# Patient Record
Sex: MALE | Race: WHITE | Employment: OTHER | URBAN - METROPOLITAN AREA
[De-identification: names, ages, dates, MRNs, and addresses within clinical notes are randomized per-mention and may not be internally consistent; named-entity substitution may affect disease eponyms.]

---

## 2024-06-26 RX ORDER — HYDROCODONE BITARTRATE AND ACETAMINOPHEN 10; 325 MG/1; MG/1
1 TABLET ORAL EVERY 6 HOURS PRN
Status: ON HOLD | COMMUNITY
End: 2024-07-05 | Stop reason: HOSPADM

## 2024-06-26 RX ORDER — CARVEDILOL 12.5 MG/1
12.5 TABLET ORAL DAILY
COMMUNITY

## 2024-06-26 RX ORDER — ZOLPIDEM TARTRATE 10 MG/1
10 TABLET ORAL NIGHTLY PRN
COMMUNITY

## 2024-06-26 RX ORDER — IRBESARTAN 150 MG/1
150 TABLET ORAL NIGHTLY
COMMUNITY

## 2024-06-26 RX ORDER — BACLOFEN 10 MG/1
10 TABLET ORAL 2 TIMES DAILY
COMMUNITY

## 2024-06-26 RX ORDER — GABAPENTIN 600 MG/1
600 TABLET ORAL 4 TIMES DAILY
COMMUNITY

## 2024-06-26 RX ORDER — TIRZEPATIDE 7.5 MG/.5ML
7.5 INJECTION, SOLUTION SUBCUTANEOUS WEEKLY
COMMUNITY

## 2024-06-26 RX ORDER — GLIMEPIRIDE 4 MG/1
4 TABLET ORAL 2 TIMES DAILY WITH MEALS
COMMUNITY

## 2024-06-26 RX ORDER — FAMOTIDINE 10 MG
10 TABLET ORAL 2 TIMES DAILY PRN
COMMUNITY

## 2024-06-26 RX ORDER — CLOPIDOGREL BISULFATE 75 MG/1
75 TABLET ORAL DAILY
Status: ON HOLD | COMMUNITY
End: 2024-07-05

## 2024-06-26 RX ORDER — OXCARBAZEPINE 600 MG/1
600 TABLET, FILM COATED ORAL 2 TIMES DAILY
COMMUNITY

## 2024-06-26 RX ORDER — CITALOPRAM 20 MG/1
20 TABLET ORAL DAILY
COMMUNITY

## 2024-06-26 RX ORDER — AMLODIPINE BESYLATE 10 MG/1
10 TABLET ORAL DAILY
COMMUNITY

## 2024-06-26 RX ORDER — TAMSULOSIN HYDROCHLORIDE 0.4 MG/1
0.4 CAPSULE ORAL DAILY
COMMUNITY

## 2024-06-26 RX ORDER — NITROGLYCERIN 0.4 MG/1
0.4 TABLET SUBLINGUAL EVERY 5 MIN PRN
COMMUNITY

## 2024-06-26 RX ORDER — ROPINIROLE 2 MG/1
2 TABLET, FILM COATED ORAL 4 TIMES DAILY
COMMUNITY

## 2024-06-26 RX ORDER — SITAGLIPTIN AND METFORMIN HYDROCHLORIDE 500; 50 MG/1; MG/1
1 TABLET, FILM COATED ORAL 2 TIMES DAILY WITH MEALS
COMMUNITY

## 2024-06-26 RX ORDER — DAPAGLIFLOZIN 10 MG/1
10 TABLET, FILM COATED ORAL EVERY MORNING
COMMUNITY

## 2024-06-26 NOTE — PROGRESS NOTES
6/26/24 @ 6377 Pt confirms went to PCP Dr. Ha Luna [phone] yesterday 6/25 for Dr. Ha Luna in [facility] for PreOp visit including EKG and Labwork. Pt and wife confirm pt Cardiologist is Dr. Flavio Holland [phone] in [place], [place] and is still working on getting a cardiac clearance. MD    6/26/24 @ 1500 Spoke with Malissa @ PCP Dr. Ha Luna [phone] requesting H&P, Labwork results, EKG tracing from 6/25 PreOp visit to be faxed to PAT# given with read back and Malissa states \"Will be getting lab results tomorrow 6/27 and will fax everything then\". MD    6/26/24 @ 4400 No evidence of orders in [employer]/Mad Mimi via this nurse and EVE Hall RN. Left VM with Laureano @ Dr. Helm's office requesting orders to be faxed to PAT# given and that pt is still working on getting cardiac clearance. MD

## 2024-06-26 NOTE — PROGRESS NOTES
[facility] PRE-SURGICAL TESTING INSTRUCTIONS                      PRIOR TO PROCEDURE DATE:    1. PLEASE FOLLOW ANY INSTRUCTIONS GIVEN TO YOU PER YOUR SURGEON.      2. Arrange for someone to drive you home and be with you for the first 24 hours after discharge for your safety after your procedure for which you received sedation. Ensure it is someone we can share information with regarding your discharge.     NOTE: At this time ONLY 2 ADULTS may accompany you   One person ENCOURAGED to stay at hospital entire time if outpatient surgery      3. You must contact your surgeon for instructions IF:  You are taking any blood thinners, aspirin, anti-inflammatory or vitamins.  There is a change in your physical condition such as a cold, fever, rash, cuts, sores, or any other infection, especially near your surgical site.    4. Do not drink alcohol the day before or day of your procedure.  Do not use any recreational marijuana at least 24 hours or street drugs (heroin, cocaine) at minimum 5 days prior to your procedure.     5. A Pre-Surgical History and Physical MUST be completed WITHIN 30 DAYS OR LESS prior to your procedure.by your Physician or an Urgent Care        THE DAY OF YOUR PROCEDURE:  1.  Follow instructions for ARRIVAL TIME as DIRECTED BY YOUR SURGEON.     2. Enter the MAIN entrance from [facility] and follow the signs to the free Parking Garage or  Parking (offered free of charge 7 am-5pm).      3. Enter the Main Entrance of the hospital (do not enter from the lower level of the parking garage). Upon entrance, check in with the  at the surgical information desk on your LEFT.   Bring your insurance card and photo ID to register      4. DO NOT EAT ANYTHING 8 hours prior to arrival for surgery.  You may have up to 8 ounces of water 4 hours prior to your arrival for surgery.   NOTE: ALL Gastric, Bariatric & Bowel surgery patients - you MUST follow your surgeon's instructions regarding  eating/ drinking as you will have very specific instructions to follow.  If you did not receive these, call your surgeon's office immediately.     5. MEDICATIONS:  Take the following medications with a SMALL sip of water: Carvidelol, Amlodipine, Pepcid  Use your usual dose of inhalers the morning of surgery. BRING your rescue inhaler with you to hospital.   Anesthesia does NOT want you to take insulin the morning of surgery. They will control your blood sugar while you are at the hospital. Please contact your ordering physician for instructions regarding your insulin the night before your procedure. If you have an insulin pump, please keep it set on basal rate.   Bariatric patient's call your surgeon if on diabetic medications as some may need to be stopped 1 week prior to surgery    6. Do not swallow additional water when brushing teeth. No gum, candy, mints, or ice chips. Refrain from smoking or at least decrease the amount on day of surgery.    7. Morning of surgery:   Take a shower with an antibacterial soap (i.e., Safeguard or Dial) OR your physician may have instructed you to use Hibiclens.  Dress in loose, comfortable clothing appropriate for redressing after your procedure.   Do not wear jewelry (including body piercings), make-up (especially NO eye make-up), fingernail polish (NO toenail polish if foot/leg surgery), lotion, powders, or metal hairclips.   Do not shave or wax for 72 hours prior to procedure near your operative site. Shaving with a razor can irritate your skin and make it easier to develop an infection. On the day of your procedure, any hair that needs to be removed near the surgical site will be 'clipped' by a healthcare worker using a special clipper designed to avoid skin irritation.    8. Dentures, glasses, or contacts will need to be removed before your procedure. Bring cases for your glasses, contacts, dentures, or hearing aids to protect them while you are in surgery.      9. If you use  a CPAP, please bring it with you on the day of your procedure.    10. If you use oxygen at home, please bring your oxygen tank with you to hospital..     11. We recommend that valuable personal belongings such as cash, cell phones, e-tablets, or jewelry, be left at home during your stay. The hospital will not be responsible for valuables that are not secured in the hospital safe. However, if your insurance requires a co-pay, you may want to bring a method of payment, i.e., Check or credit card, if you wish to pay your co-pay the day of surgery.      12. If you are to stay overnight, you may bring a bag with personal items. Please have any large items you may need brought in by your family after your arrival to your hospital room.    13. If you have a Living Will or Durable Power of , please bring a copy on the day of your procedure.     How we keep you safe and work to prevent surgical site infections:   1. Health care workers should always check your ID bracelet to verify your name and birth date. You will be asked many times to state your name, date of birth, and allergies.    2. Health care workers should always clean their hands with soap or alcohol gel before providing care to you. It is okay to ask anyone if they cleaned their hands before they touch you.    3. You will be actively involved in verifying the type of procedure you are having and ensuring the correct surgical site. This will be confirmed multiple times prior to your procedure. Do NOT mee your surgery site UNLESS instructed to by your surgeon.     4. When you are in the operating room, your surgical site will be cleansed with a special soap, and in most cases, you will be given an antibiotic before the surgery begins.      What to expect AFTER your procedure?  1. Immediately following your procedure, your will be taken to the PACU for the first phase of your recovery.  Your nurse will help you recover from any potential side effects of

## 2024-07-03 ENCOUNTER — APPOINTMENT (OUTPATIENT)
Dept: CT IMAGING | Age: 64
End: 2024-07-03
Attending: NEUROLOGICAL SURGERY
Payer: MEDICARE

## 2024-07-03 ENCOUNTER — ANESTHESIA EVENT (OUTPATIENT)
Dept: OPERATING ROOM | Age: 64
End: 2024-07-03
Payer: MEDICARE

## 2024-07-03 ENCOUNTER — ANESTHESIA (OUTPATIENT)
Dept: OPERATING ROOM | Age: 64
End: 2024-07-03
Payer: MEDICARE

## 2024-07-03 ENCOUNTER — HOSPITAL ENCOUNTER (INPATIENT)
Age: 64
LOS: 2 days | Discharge: HOME | End: 2024-07-05
Attending: NEUROLOGICAL SURGERY | Admitting: NEUROLOGICAL SURGERY
Payer: MEDICARE

## 2024-07-03 DIAGNOSIS — Z98.890 S/P CRANIOTOMY: Primary | ICD-10-CM

## 2024-07-03 PROBLEM — G50.0 TRIGEMINAL NEURALGIA: Status: ACTIVE | Noted: 2024-07-03

## 2024-07-03 LAB
ABO + RH BLD: NORMAL
APTT BLD: 24.9 SEC (ref 22.1–36.4)
BLD GP AB SCN SERPL QL: NORMAL
GLUCOSE BLD-MCNC: 149 MG/DL (ref 70–99)
GLUCOSE BLD-MCNC: 160 MG/DL (ref 70–99)
GLUCOSE BLD-MCNC: 249 MG/DL (ref 70–99)
INR PPP: 0.89 (ref 0.85–1.15)
PERFORMED ON: ABNORMAL
PROTHROMBIN TIME: 12.2 SEC (ref 11.9–14.9)

## 2024-07-03 PROCEDURE — 6370000000 HC RX 637 (ALT 250 FOR IP): Performed by: NEUROLOGICAL SURGERY

## 2024-07-03 PROCEDURE — 6360000002 HC RX W HCPCS: Performed by: ANESTHESIOLOGY

## 2024-07-03 PROCEDURE — 2780000010 HC IMPLANT OTHER: Performed by: NEUROLOGICAL SURGERY

## 2024-07-03 PROCEDURE — 7100000001 HC PACU RECOVERY - ADDTL 15 MIN: Performed by: NEUROLOGICAL SURGERY

## 2024-07-03 PROCEDURE — 3600000004 HC SURGERY LEVEL 4 BASE: Performed by: NEUROLOGICAL SURGERY

## 2024-07-03 PROCEDURE — 3700000000 HC ANESTHESIA ATTENDED CARE: Performed by: NEUROLOGICAL SURGERY

## 2024-07-03 PROCEDURE — 7100000000 HC PACU RECOVERY - FIRST 15 MIN: Performed by: NEUROLOGICAL SURGERY

## 2024-07-03 PROCEDURE — 6370000000 HC RX 637 (ALT 250 FOR IP): Performed by: PHYSICIAN ASSISTANT

## 2024-07-03 PROCEDURE — 2500000003 HC RX 250 WO HCPCS: Performed by: NEUROLOGICAL SURGERY

## 2024-07-03 PROCEDURE — 86901 BLOOD TYPING SEROLOGIC RH(D): CPT

## 2024-07-03 PROCEDURE — 2709999900 HC NON-CHARGEABLE SUPPLY: Performed by: NEUROLOGICAL SURGERY

## 2024-07-03 PROCEDURE — 00NK0ZZ RELEASE TRIGEMINAL NERVE, OPEN APPROACH: ICD-10-PCS | Performed by: NEUROLOGICAL SURGERY

## 2024-07-03 PROCEDURE — 2580000003 HC RX 258: Performed by: NEUROLOGICAL SURGERY

## 2024-07-03 PROCEDURE — 86850 RBC ANTIBODY SCREEN: CPT

## 2024-07-03 PROCEDURE — 2580000003 HC RX 258: Performed by: PHYSICIAN ASSISTANT

## 2024-07-03 PROCEDURE — 2580000003 HC RX 258

## 2024-07-03 PROCEDURE — 6360000002 HC RX W HCPCS

## 2024-07-03 PROCEDURE — 6360000002 HC RX W HCPCS: Performed by: NEUROLOGICAL SURGERY

## 2024-07-03 PROCEDURE — 99291 CRITICAL CARE FIRST HOUR: CPT

## 2024-07-03 PROCEDURE — 3700000001 HC ADD 15 MINUTES (ANESTHESIA): Performed by: NEUROLOGICAL SURGERY

## 2024-07-03 PROCEDURE — 6360000002 HC RX W HCPCS: Performed by: PHYSICIAN ASSISTANT

## 2024-07-03 PROCEDURE — 1200000000 HC SEMI PRIVATE

## 2024-07-03 PROCEDURE — 85730 THROMBOPLASTIN TIME PARTIAL: CPT

## 2024-07-03 PROCEDURE — 70450 CT HEAD/BRAIN W/O DYE: CPT

## 2024-07-03 PROCEDURE — 2500000003 HC RX 250 WO HCPCS

## 2024-07-03 PROCEDURE — 3600000014 HC SURGERY LEVEL 4 ADDTL 15MIN: Performed by: NEUROLOGICAL SURGERY

## 2024-07-03 PROCEDURE — 2720000010 HC SURG SUPPLY STERILE: Performed by: NEUROLOGICAL SURGERY

## 2024-07-03 PROCEDURE — A4217 STERILE WATER/SALINE, 500 ML: HCPCS | Performed by: NEUROLOGICAL SURGERY

## 2024-07-03 PROCEDURE — 85610 PROTHROMBIN TIME: CPT

## 2024-07-03 PROCEDURE — 86900 BLOOD TYPING SEROLOGIC ABO: CPT

## 2024-07-03 DEVICE — PTERIONAL-LEFT-SMOOTH
Type: IMPLANTABLE DEVICE | Status: FUNCTIONAL
Brand: MEDPOR

## 2024-07-03 RX ORDER — MEPERIDINE HYDROCHLORIDE 25 MG/ML
12.5 INJECTION INTRAMUSCULAR; INTRAVENOUS; SUBCUTANEOUS EVERY 5 MIN PRN
Status: DISCONTINUED | OUTPATIENT
Start: 2024-07-03 | End: 2024-07-03 | Stop reason: HOSPADM

## 2024-07-03 RX ORDER — FAMOTIDINE 20 MG/1
20 TABLET, FILM COATED ORAL 2 TIMES DAILY
Status: DISCONTINUED | OUTPATIENT
Start: 2024-07-03 | End: 2024-07-05 | Stop reason: HOSPADM

## 2024-07-03 RX ORDER — SODIUM CHLORIDE 0.9 % (FLUSH) 0.9 %
5-40 SYRINGE (ML) INJECTION PRN
Status: DISCONTINUED | OUTPATIENT
Start: 2024-07-03 | End: 2024-07-03 | Stop reason: HOSPADM

## 2024-07-03 RX ORDER — FLUTICASONE PROPIONATE 50 MCG
1 SPRAY, SUSPENSION (ML) NASAL DAILY
COMMUNITY

## 2024-07-03 RX ORDER — CIPROFLOXACIN HYDROCHLORIDE 3.5 MG/ML
SOLUTION/ DROPS TOPICAL PRN
Status: DISCONTINUED | OUTPATIENT
Start: 2024-07-03 | End: 2024-07-03 | Stop reason: HOSPADM

## 2024-07-03 RX ORDER — DEXAMETHASONE 4 MG/1
4 TABLET ORAL EVERY 6 HOURS
Status: DISCONTINUED | OUTPATIENT
Start: 2024-07-03 | End: 2024-07-05 | Stop reason: HOSPADM

## 2024-07-03 RX ORDER — ONDANSETRON 2 MG/ML
4 INJECTION INTRAMUSCULAR; INTRAVENOUS EVERY 6 HOURS PRN
Status: DISCONTINUED | OUTPATIENT
Start: 2024-07-03 | End: 2024-07-05 | Stop reason: HOSPADM

## 2024-07-03 RX ORDER — MORPHINE SULFATE 4 MG/ML
4 INJECTION INTRAVENOUS
Status: DISCONTINUED | OUTPATIENT
Start: 2024-07-03 | End: 2024-07-05 | Stop reason: HOSPADM

## 2024-07-03 RX ORDER — HYDROMORPHONE HYDROCHLORIDE 1 MG/ML
0.5 INJECTION, SOLUTION INTRAMUSCULAR; INTRAVENOUS; SUBCUTANEOUS EVERY 5 MIN PRN
Status: DISCONTINUED | OUTPATIENT
Start: 2024-07-03 | End: 2024-07-03 | Stop reason: HOSPADM

## 2024-07-03 RX ORDER — FAMOTIDINE 20 MG/1
10 TABLET, FILM COATED ORAL 2 TIMES DAILY PRN
Status: DISCONTINUED | OUTPATIENT
Start: 2024-07-03 | End: 2024-07-05 | Stop reason: HOSPADM

## 2024-07-03 RX ORDER — OXCARBAZEPINE 300 MG/1
600 TABLET, FILM COATED ORAL 2 TIMES DAILY
Status: DISCONTINUED | OUTPATIENT
Start: 2024-07-03 | End: 2024-07-05 | Stop reason: HOSPADM

## 2024-07-03 RX ORDER — SODIUM CHLORIDE 9 MG/ML
INJECTION, SOLUTION INTRAVENOUS CONTINUOUS PRN
Status: DISCONTINUED | OUTPATIENT
Start: 2024-07-03 | End: 2024-07-03

## 2024-07-03 RX ORDER — CARVEDILOL 12.5 MG/1
12.5 TABLET ORAL DAILY
Status: DISCONTINUED | OUTPATIENT
Start: 2024-07-04 | End: 2024-07-05 | Stop reason: HOSPADM

## 2024-07-03 RX ORDER — NALOXONE HYDROCHLORIDE 0.4 MG/ML
INJECTION, SOLUTION INTRAMUSCULAR; INTRAVENOUS; SUBCUTANEOUS PRN
Status: DISCONTINUED | OUTPATIENT
Start: 2024-07-03 | End: 2024-07-03 | Stop reason: HOSPADM

## 2024-07-03 RX ORDER — PROPOFOL 10 MG/ML
INJECTION, EMULSION INTRAVENOUS CONTINUOUS PRN
Status: DISCONTINUED | OUTPATIENT
Start: 2024-07-03 | End: 2024-07-03 | Stop reason: SDUPTHER

## 2024-07-03 RX ORDER — DEXAMETHASONE SODIUM PHOSPHATE 4 MG/ML
INJECTION, SOLUTION INTRA-ARTICULAR; INTRALESIONAL; INTRAMUSCULAR; INTRAVENOUS; SOFT TISSUE PRN
Status: DISCONTINUED | OUTPATIENT
Start: 2024-07-03 | End: 2024-07-03 | Stop reason: SDUPTHER

## 2024-07-03 RX ORDER — MANNITOL 20 G/100ML
INJECTION, SOLUTION INTRAVENOUS PRN
Status: DISCONTINUED | OUTPATIENT
Start: 2024-07-03 | End: 2024-07-03 | Stop reason: SDUPTHER

## 2024-07-03 RX ORDER — SODIUM CHLORIDE, SODIUM LACTATE, POTASSIUM CHLORIDE, CALCIUM CHLORIDE 600; 310; 30; 20 MG/100ML; MG/100ML; MG/100ML; MG/100ML
INJECTION, SOLUTION INTRAVENOUS CONTINUOUS
Status: DISCONTINUED | OUTPATIENT
Start: 2024-07-03 | End: 2024-07-03 | Stop reason: HOSPADM

## 2024-07-03 RX ORDER — SODIUM CHLORIDE 0.9 % (FLUSH) 0.9 %
5-40 SYRINGE (ML) INJECTION EVERY 12 HOURS SCHEDULED
Status: DISCONTINUED | OUTPATIENT
Start: 2024-07-03 | End: 2024-07-05 | Stop reason: HOSPADM

## 2024-07-03 RX ORDER — SODIUM CHLORIDE 0.9 % (FLUSH) 0.9 %
5-40 SYRINGE (ML) INJECTION EVERY 12 HOURS SCHEDULED
Status: DISCONTINUED | OUTPATIENT
Start: 2024-07-03 | End: 2024-07-03 | Stop reason: HOSPADM

## 2024-07-03 RX ORDER — ALOGLIPTIN 12.5 MG/1
12.5 TABLET, FILM COATED ORAL DAILY
Status: DISCONTINUED | OUTPATIENT
Start: 2024-07-04 | End: 2024-07-05 | Stop reason: HOSPADM

## 2024-07-03 RX ORDER — MORPHINE SULFATE 2 MG/ML
2 INJECTION, SOLUTION INTRAMUSCULAR; INTRAVENOUS
Status: DISCONTINUED | OUTPATIENT
Start: 2024-07-03 | End: 2024-07-05 | Stop reason: HOSPADM

## 2024-07-03 RX ORDER — OXYCODONE HYDROCHLORIDE 5 MG/1
5 TABLET ORAL EVERY 4 HOURS PRN
Status: DISCONTINUED | OUTPATIENT
Start: 2024-07-03 | End: 2024-07-05 | Stop reason: HOSPADM

## 2024-07-03 RX ORDER — LABETALOL HYDROCHLORIDE 5 MG/ML
10 INJECTION, SOLUTION INTRAVENOUS
Status: DISCONTINUED | OUTPATIENT
Start: 2024-07-03 | End: 2024-07-03 | Stop reason: HOSPADM

## 2024-07-03 RX ORDER — EPHEDRINE SULFATE 50 MG/ML
INJECTION INTRAVENOUS PRN
Status: DISCONTINUED | OUTPATIENT
Start: 2024-07-03 | End: 2024-07-03 | Stop reason: SDUPTHER

## 2024-07-03 RX ORDER — FENTANYL CITRATE 50 UG/ML
INJECTION, SOLUTION INTRAMUSCULAR; INTRAVENOUS PRN
Status: DISCONTINUED | OUTPATIENT
Start: 2024-07-03 | End: 2024-07-03 | Stop reason: SDUPTHER

## 2024-07-03 RX ORDER — LIDOCAINE HYDROCHLORIDE 20 MG/ML
INJECTION, SOLUTION INTRAVENOUS PRN
Status: DISCONTINUED | OUTPATIENT
Start: 2024-07-03 | End: 2024-07-03 | Stop reason: SDUPTHER

## 2024-07-03 RX ORDER — GABAPENTIN 600 MG/1
600 TABLET ORAL 4 TIMES DAILY
Status: DISCONTINUED | OUTPATIENT
Start: 2024-07-03 | End: 2024-07-05 | Stop reason: HOSPADM

## 2024-07-03 RX ORDER — TAMSULOSIN HYDROCHLORIDE 0.4 MG/1
0.4 CAPSULE ORAL DAILY
Status: DISCONTINUED | OUTPATIENT
Start: 2024-07-03 | End: 2024-07-05 | Stop reason: HOSPADM

## 2024-07-03 RX ORDER — GLIPIZIDE 5 MG/1
2.5 TABLET ORAL
Status: DISCONTINUED | OUTPATIENT
Start: 2024-07-04 | End: 2024-07-05 | Stop reason: HOSPADM

## 2024-07-03 RX ORDER — INSULIN LISPRO 100 [IU]/ML
0-4 INJECTION, SOLUTION INTRAVENOUS; SUBCUTANEOUS
Status: DISCONTINUED | OUTPATIENT
Start: 2024-07-03 | End: 2024-07-04

## 2024-07-03 RX ORDER — ONDANSETRON 4 MG/1
4 TABLET, ORALLY DISINTEGRATING ORAL EVERY 8 HOURS PRN
Status: DISCONTINUED | OUTPATIENT
Start: 2024-07-03 | End: 2024-07-05 | Stop reason: HOSPADM

## 2024-07-03 RX ORDER — GLYCOPYRROLATE 0.2 MG/ML
INJECTION INTRAMUSCULAR; INTRAVENOUS PRN
Status: DISCONTINUED | OUTPATIENT
Start: 2024-07-03 | End: 2024-07-03 | Stop reason: SDUPTHER

## 2024-07-03 RX ORDER — ONDANSETRON 2 MG/ML
INJECTION INTRAMUSCULAR; INTRAVENOUS PRN
Status: DISCONTINUED | OUTPATIENT
Start: 2024-07-03 | End: 2024-07-03 | Stop reason: SDUPTHER

## 2024-07-03 RX ORDER — HYDROMORPHONE HYDROCHLORIDE 1 MG/ML
0.5 INJECTION, SOLUTION INTRAMUSCULAR; INTRAVENOUS; SUBCUTANEOUS EVERY 10 MIN PRN
Status: DISCONTINUED | OUTPATIENT
Start: 2024-07-03 | End: 2024-07-03 | Stop reason: HOSPADM

## 2024-07-03 RX ORDER — SODIUM CHLORIDE 9 MG/ML
INJECTION, SOLUTION INTRAVENOUS PRN
Status: DISCONTINUED | OUTPATIENT
Start: 2024-07-03 | End: 2024-07-03 | Stop reason: HOSPADM

## 2024-07-03 RX ORDER — LABETALOL HYDROCHLORIDE 5 MG/ML
10 INJECTION, SOLUTION INTRAVENOUS EVERY 4 HOURS PRN
Status: DISCONTINUED | OUTPATIENT
Start: 2024-07-03 | End: 2024-07-05 | Stop reason: HOSPADM

## 2024-07-03 RX ORDER — LEVETIRACETAM 500 MG/1
500 TABLET ORAL EVERY 12 HOURS
Status: DISCONTINUED | OUTPATIENT
Start: 2024-07-03 | End: 2024-07-05 | Stop reason: HOSPADM

## 2024-07-03 RX ORDER — SODIUM CHLORIDE 0.9 % (FLUSH) 0.9 %
5-40 SYRINGE (ML) INJECTION PRN
Status: DISCONTINUED | OUTPATIENT
Start: 2024-07-03 | End: 2024-07-05 | Stop reason: HOSPADM

## 2024-07-03 RX ORDER — SODIUM CHLORIDE, SODIUM LACTATE, POTASSIUM CHLORIDE, CALCIUM CHLORIDE 600; 310; 30; 20 MG/100ML; MG/100ML; MG/100ML; MG/100ML
INJECTION, SOLUTION INTRAVENOUS CONTINUOUS PRN
Status: DISCONTINUED | OUTPATIENT
Start: 2024-07-03 | End: 2024-07-03 | Stop reason: SDUPTHER

## 2024-07-03 RX ORDER — DIAZEPAM 5 MG/1
5 TABLET ORAL EVERY 6 HOURS PRN
Status: DISCONTINUED | OUTPATIENT
Start: 2024-07-03 | End: 2024-07-05 | Stop reason: HOSPADM

## 2024-07-03 RX ORDER — LOSARTAN POTASSIUM 25 MG/1
25 TABLET ORAL DAILY
Status: DISCONTINUED | OUTPATIENT
Start: 2024-07-03 | End: 2024-07-05 | Stop reason: HOSPADM

## 2024-07-03 RX ORDER — METOCLOPRAMIDE HYDROCHLORIDE 5 MG/ML
10 INJECTION INTRAMUSCULAR; INTRAVENOUS
Status: DISCONTINUED | OUTPATIENT
Start: 2024-07-03 | End: 2024-07-03 | Stop reason: HOSPADM

## 2024-07-03 RX ORDER — HYDROMORPHONE HYDROCHLORIDE 2 MG/ML
INJECTION, SOLUTION INTRAMUSCULAR; INTRAVENOUS; SUBCUTANEOUS PRN
Status: DISCONTINUED | OUTPATIENT
Start: 2024-07-03 | End: 2024-07-03 | Stop reason: SDUPTHER

## 2024-07-03 RX ORDER — DIPHENHYDRAMINE HYDROCHLORIDE 50 MG/ML
12.5 INJECTION INTRAMUSCULAR; INTRAVENOUS
Status: COMPLETED | OUTPATIENT
Start: 2024-07-03 | End: 2024-07-03

## 2024-07-03 RX ORDER — HYDRALAZINE HYDROCHLORIDE 20 MG/ML
10 INJECTION INTRAMUSCULAR; INTRAVENOUS
Status: DISCONTINUED | OUTPATIENT
Start: 2024-07-03 | End: 2024-07-03 | Stop reason: HOSPADM

## 2024-07-03 RX ORDER — SODIUM CHLORIDE 9 MG/ML
INJECTION, SOLUTION INTRAVENOUS PRN
Status: DISCONTINUED | OUTPATIENT
Start: 2024-07-03 | End: 2024-07-05 | Stop reason: HOSPADM

## 2024-07-03 RX ORDER — INSULIN LISPRO 100 [IU]/ML
0-4 INJECTION, SOLUTION INTRAVENOUS; SUBCUTANEOUS NIGHTLY
Status: DISCONTINUED | OUTPATIENT
Start: 2024-07-03 | End: 2024-07-05 | Stop reason: HOSPADM

## 2024-07-03 RX ORDER — ACETAMINOPHEN 325 MG/1
650 TABLET ORAL EVERY 6 HOURS
Status: DISCONTINUED | OUTPATIENT
Start: 2024-07-03 | End: 2024-07-05 | Stop reason: HOSPADM

## 2024-07-03 RX ORDER — CITALOPRAM 20 MG/1
20 TABLET ORAL DAILY
Status: DISCONTINUED | OUTPATIENT
Start: 2024-07-03 | End: 2024-07-05 | Stop reason: HOSPADM

## 2024-07-03 RX ORDER — BACLOFEN 10 MG/1
10 TABLET ORAL 2 TIMES DAILY
Status: DISCONTINUED | OUTPATIENT
Start: 2024-07-03 | End: 2024-07-05 | Stop reason: HOSPADM

## 2024-07-03 RX ORDER — ROPINIROLE 1 MG/1
2 TABLET, FILM COATED ORAL 4 TIMES DAILY
Status: DISCONTINUED | OUTPATIENT
Start: 2024-07-03 | End: 2024-07-05 | Stop reason: HOSPADM

## 2024-07-03 RX ORDER — AMLODIPINE BESYLATE 10 MG/1
10 TABLET ORAL DAILY
Status: DISCONTINUED | OUTPATIENT
Start: 2024-07-04 | End: 2024-07-05 | Stop reason: HOSPADM

## 2024-07-03 RX ORDER — SUCCINYLCHOLINE/SOD CL,ISO/PF 200MG/10ML
SYRINGE (ML) INTRAVENOUS PRN
Status: DISCONTINUED | OUTPATIENT
Start: 2024-07-03 | End: 2024-07-03 | Stop reason: SDUPTHER

## 2024-07-03 RX ORDER — OXYCODONE HYDROCHLORIDE 5 MG/1
10 TABLET ORAL EVERY 4 HOURS PRN
Status: DISCONTINUED | OUTPATIENT
Start: 2024-07-03 | End: 2024-07-05 | Stop reason: HOSPADM

## 2024-07-03 RX ORDER — PROPOFOL 10 MG/ML
INJECTION, EMULSION INTRAVENOUS PRN
Status: DISCONTINUED | OUTPATIENT
Start: 2024-07-03 | End: 2024-07-03 | Stop reason: SDUPTHER

## 2024-07-03 RX ORDER — MIDAZOLAM HYDROCHLORIDE 1 MG/ML
INJECTION INTRAMUSCULAR; INTRAVENOUS PRN
Status: DISCONTINUED | OUTPATIENT
Start: 2024-07-03 | End: 2024-07-03 | Stop reason: SDUPTHER

## 2024-07-03 RX ADMIN — OXYCODONE 5 MG: 5 TABLET ORAL at 18:11

## 2024-07-03 RX ADMIN — EPHEDRINE SULFATE 5 MG: 50 INJECTION INTRAVENOUS at 13:14

## 2024-07-03 RX ADMIN — MIDAZOLAM HYDROCHLORIDE 2 MG: 2 INJECTION, SOLUTION INTRAMUSCULAR; INTRAVENOUS at 12:13

## 2024-07-03 RX ADMIN — BACLOFEN 10 MG: 10 TABLET ORAL at 20:54

## 2024-07-03 RX ADMIN — OXCARBAZEPINE 600 MG: 300 TABLET, FILM COATED ORAL at 20:55

## 2024-07-03 RX ADMIN — PHENYLEPHRINE HYDROCHLORIDE 20 MCG/MIN: 10 INJECTION INTRAVENOUS at 13:14

## 2024-07-03 RX ADMIN — HYDROMORPHONE HYDROCHLORIDE 0.5 MG: 2 INJECTION, SOLUTION INTRAMUSCULAR; INTRAVENOUS; SUBCUTANEOUS at 15:05

## 2024-07-03 RX ADMIN — METFORMIN HYDROCHLORIDE 500 MG: 500 TABLET, FILM COATED ORAL at 20:55

## 2024-07-03 RX ADMIN — DIAZEPAM 5 MG: 5 TABLET ORAL at 22:57

## 2024-07-03 RX ADMIN — EPHEDRINE SULFATE 5 MG: 50 INJECTION INTRAVENOUS at 12:47

## 2024-07-03 RX ADMIN — DEXAMETHASONE SODIUM PHOSPHATE 10 MG: 4 INJECTION INTRA-ARTICULAR; INTRALESIONAL; INTRAMUSCULAR; INTRAVENOUS; SOFT TISSUE at 13:03

## 2024-07-03 RX ADMIN — ACETAMINOPHEN 650 MG: 325 TABLET ORAL at 19:01

## 2024-07-03 RX ADMIN — EPHEDRINE SULFATE 5 MG: 50 INJECTION INTRAVENOUS at 13:00

## 2024-07-03 RX ADMIN — WATER 2000 MG: 1 INJECTION INTRAMUSCULAR; INTRAVENOUS; SUBCUTANEOUS at 12:20

## 2024-07-03 RX ADMIN — TAMSULOSIN HYDROCHLORIDE 0.4 MG: 0.4 CAPSULE ORAL at 20:54

## 2024-07-03 RX ADMIN — LEVETIRACETAM 500 MG: 500 TABLET, FILM COATED ORAL at 19:01

## 2024-07-03 RX ADMIN — Medication 160 MG: at 12:19

## 2024-07-03 RX ADMIN — HYDROMORPHONE HYDROCHLORIDE 1 MG: 2 INJECTION, SOLUTION INTRAMUSCULAR; INTRAVENOUS; SUBCUTANEOUS at 12:32

## 2024-07-03 RX ADMIN — DEXAMETHASONE 4 MG: 4 TABLET ORAL at 23:56

## 2024-07-03 RX ADMIN — MANNITOL 40 G: 20 INJECTION, SOLUTION INTRAVENOUS at 13:06

## 2024-07-03 RX ADMIN — CITALOPRAM HYDROBROMIDE 20 MG: 20 TABLET ORAL at 20:55

## 2024-07-03 RX ADMIN — PROPOFOL 200 MG: 10 INJECTION, EMULSION INTRAVENOUS at 12:19

## 2024-07-03 RX ADMIN — PROPOFOL 150 MCG/KG/MIN: 10 INJECTION, EMULSION INTRAVENOUS at 12:25

## 2024-07-03 RX ADMIN — ROPINIROLE HYDROCHLORIDE 2 MG: 1 TABLET, FILM COATED ORAL at 20:54

## 2024-07-03 RX ADMIN — FAMOTIDINE 10 MG: 20 TABLET, FILM COATED ORAL at 20:54

## 2024-07-03 RX ADMIN — HYDROMORPHONE HYDROCHLORIDE 0.5 MG: 2 INJECTION, SOLUTION INTRAMUSCULAR; INTRAVENOUS; SUBCUTANEOUS at 15:01

## 2024-07-03 RX ADMIN — WATER 2000 MG: 1 INJECTION INTRAMUSCULAR; INTRAVENOUS; SUBCUTANEOUS at 20:55

## 2024-07-03 RX ADMIN — SODIUM CHLORIDE, PRESERVATIVE FREE 10 ML: 5 INJECTION INTRAVENOUS at 20:55

## 2024-07-03 RX ADMIN — ACETAMINOPHEN 650 MG: 325 TABLET ORAL at 23:56

## 2024-07-03 RX ADMIN — LOSARTAN POTASSIUM 25 MG: 25 TABLET, FILM COATED ORAL at 20:54

## 2024-07-03 RX ADMIN — FAMOTIDINE 20 MG: 20 TABLET, FILM COATED ORAL at 20:56

## 2024-07-03 RX ADMIN — DIPHENHYDRAMINE HYDROCHLORIDE 12.5 MG: 50 INJECTION INTRAMUSCULAR; INTRAVENOUS at 16:34

## 2024-07-03 RX ADMIN — MORPHINE SULFATE 2 MG: 2 INJECTION, SOLUTION INTRAMUSCULAR; INTRAVENOUS at 21:09

## 2024-07-03 RX ADMIN — DEXAMETHASONE 4 MG: 4 TABLET ORAL at 19:01

## 2024-07-03 RX ADMIN — GABAPENTIN 600 MG: 600 TABLET, FILM COATED ORAL at 20:54

## 2024-07-03 RX ADMIN — GLYCOPYRROLATE 0.2 MG: 0.2 INJECTION INTRAMUSCULAR; INTRAVENOUS at 12:55

## 2024-07-03 RX ADMIN — LIDOCAINE HYDROCHLORIDE 100 MG: 20 INJECTION, SOLUTION INTRAVENOUS at 12:19

## 2024-07-03 RX ADMIN — ONDANSETRON 4 MG: 2 INJECTION INTRAMUSCULAR; INTRAVENOUS at 14:12

## 2024-07-03 RX ADMIN — REMIFENTANIL HYDROCHLORIDE 0.15 MCG/KG/MIN: 1 INJECTION, POWDER, LYOPHILIZED, FOR SOLUTION INTRAVENOUS at 12:25

## 2024-07-03 RX ADMIN — SODIUM CHLORIDE, SODIUM LACTATE, POTASSIUM CHLORIDE, AND CALCIUM CHLORIDE: .6; .31; .03; .02 INJECTION, SOLUTION INTRAVENOUS at 12:13

## 2024-07-03 RX ADMIN — OXYCODONE 10 MG: 5 TABLET ORAL at 22:56

## 2024-07-03 RX ADMIN — FENTANYL CITRATE 100 MCG: 50 INJECTION, SOLUTION INTRAMUSCULAR; INTRAVENOUS at 12:19

## 2024-07-03 ASSESSMENT — PAIN DESCRIPTION - ORIENTATION
ORIENTATION: LEFT
ORIENTATION: LEFT

## 2024-07-03 ASSESSMENT — PAIN DESCRIPTION - DESCRIPTORS
DESCRIPTORS: ACHING;DULL
DESCRIPTORS: ACHING
DESCRIPTORS: ACHING;DISCOMFORT

## 2024-07-03 ASSESSMENT — PAIN DESCRIPTION - LOCATION
LOCATION: HEAD

## 2024-07-03 ASSESSMENT — PAIN SCALES - GENERAL
PAINLEVEL_OUTOF10: 0
PAINLEVEL_OUTOF10: 6
PAINLEVEL_OUTOF10: 6
PAINLEVEL_OUTOF10: 4
PAINLEVEL_OUTOF10: 8
PAINLEVEL_OUTOF10: 5
PAINLEVEL_OUTOF10: 6

## 2024-07-03 ASSESSMENT — PAIN - FUNCTIONAL ASSESSMENT
PAIN_FUNCTIONAL_ASSESSMENT: 0-10
PAIN_FUNCTIONAL_ASSESSMENT: ACTIVITIES ARE NOT PREVENTED

## 2024-07-03 NOTE — PROGRESS NOTES
PACU Transfer to Floor Note    Procedure(s):  LEFT MICROVASCULAR DECOMPRESSIN, POSSIBLE RHIZOTOMY    Current Allergies: Lactose    Pt meets criteria as per Ariella Score and ASPAN Standards to transfer to next phase of care.     Recent Labs     07/03/24  1117   POCGLU 160*       Vitals:    07/03/24 1641   BP:    Pulse: 83   Resp: 28   Temp:    SpO2:      Vitals within 20% of pt's admission vitals as per ARIELLA SCORE    SpO2: 100 %    O2 Flow Rate (L/min): 6 L/min      Intake/Output Summary (Last 24 hours) at 7/3/2024 1646  Last data filed at 7/3/2024 1641  Gross per 24 hour   Intake 2200 ml   Output 2300 ml   Net -100 ml     Patient transported to CT on way to ICU  Pain assessment:  not receiving treatment    Pain Level: 4    Patient was assessed for alterations to skin integrity. There were not alterations observed.    Is patient incontinent: no    Handoff report given at bedside.   Family updated and directed to pt room      7/3/2024 4:46 PM

## 2024-07-03 NOTE — H&P
I saw and examined Aston Aguilar on 7/3/2024There has been no change to her history or physical in the interval time since consent. He will proceed with the planned procedure.     Briana Nuno PA-C

## 2024-07-03 NOTE — CONSULTS
NEUROCRITICAL CARE CONSULT NOTE       Franco Helm MD is requesting this consult.  Reason for Consult: Post-op ICU management   Admission Chief Complaint: Presented for elective surgery     History of Present Illness     Aston Aguilar is a 63 y.o. y/o male with a h/o stroke DM 2, HLD and trigeminal neuralgia who presents s/p elective L microvascular decompression.    REVIEW OF SYSTEMS:   Constitutional- No weight loss or fevers   Eyes- No diplopia. No photophobia.   Ears/nose/throat- No dysphagia. No Dysarthria   Cardiovascular- No palpitations. No chest pain   Respiratory- No dyspnea. No Cough   Gastrointestinal- No Abdominal pain. No Vomiting.   Genitourinary- No incontinence. No urinary retention   Musculoskeletal- No myalgia. No arthralgia   Skin- No rash. No easy bruising.   Psychiatric- No depression. No anxiety   Endocrine- No diabetes. No thyroid issues.   Hematologic- No bleeding difficulty. No fatigue   Neurologic- No vision changes. C/o headache at incision, L sided weakness and decreased sensation from previous stroke.     Past Medical, Surgical, Family, and Social History   PAST MEDICAL HISTORY:  Past Medical History:   Diagnosis Date    Broken toes     CAD (coronary artery disease)     Cerebral artery occlusion with cerebral infarction (HCC)     Diabetes mellitus (HCC)     ED (erectile dysfunction)     Enlarged prostate     GERD (gastroesophageal reflux disease)     Hyperlipidemia     Hypertension     PONV (postoperative nausea and vomiting)     Trigeminal neuralgia     Urine frequency     Wears glasses      SURGICAL HISTORY:  Past Surgical History:   Procedure Laterality Date    CEREBRAL MICROVASCULAR DECOMPRESSION Left     COLONOSCOPY      CORONARY STENT PLACEMENT  2016    x1 stent    ENDOSCOPY, COLON, DIAGNOSTIC      LYMPH NODE BIOPSY Right     groin    ROTATOR CUFF REPAIR Left 2018    ROTATOR CUFF REPAIR Right 2022    SKIN BIOPSY      TONSILLECTOMY       FAMILY HISTORY & SOCIAL    SpO2 100%   BMI 30.74 kg/m²     General Alert, no distress, well-nourished    Neurologic  Mental status:   Orientation to person, place, time, situation  Attention intact as able to attend well to the exam    Language fluent in conversation  Comprehension intact; follows simple commands    Cranial nerves:   CN2: Visual fields full w/o extinction on confrontational testing  CN 3,4,6: Pupils equal and reactive to light, extraocular muscles intact  CN5: Facial sensation symmetric   CN7: Face symmetric  CN8: Hearing symmetric to spoken voice  CN9: Palate elevated symmetrically  CN11: Traps full strength on shoulder shrug  CN12: Tongue midline with protrusion    Motor Exam:   R  L    Deltoid 5 4   Biceps 5 4   Triceps 5 4   Wrist extension  5 4   Interossei 5 4      R  L    Hip flexion  5 4   Hip extension  5 4   Knee flexion  5 4   Knee extension  5 4   Ankle dorsiflexion  5 4   Ankle plantar flexion  5 4       Sensory: light touch intact in all 4 extremities, decreased sensation on L side  Cerebellar/coordination: finger nose finger normal without ataxia  Tone: normal in all 4 extremities  Incision: edges approximated, dry dressing CDI    Cardiovascular: Warm, appears well perfused   Respiratory: Easy, non-labored respiratory pattern   Abdominal: Abdomen is without distention   Extremities: Upper and lower extremities are atraumatic in appearance without deformity.    Diagnostic Results   IMAGES:  Images personally reviewed and agree w/ radiology interpretation of :    CT Head w/o Contrast:  IMPRESSION:  1.  Status post left microvascular decompression without evidence of  complication.      LABS:  All results below personally reviewed. Pertinent positives & negatives are addressed in Impression & Plan below.     LABS   Metabolic Panel No results for input(s): \"NA\", \"K\", \"CL\", \"CO2\", \"BUN\", \"CREATININE\", \"GLUCOSE\", \"CALCIUM\", \"LABALBU\", \"PHOS\", \"MG\", \"ALKPHOS\", \"ALT\", \"AST\", \"AMMONIA\" in the last 72 hours.   CBC /  Jess Recent Labs     07/03/24  1128   INR 0.89      Other No results found for: \"LABA1C\", \"TRIG\", \"TSH\", \"CSRNOSJD33\", \"FOLATE\", \"LABSALI\", \"COVID19\"  No results found for: \"PHENYTOIN\", \"LACOSA\", \"LAMO\", \"VALPROATE\", \"LACTSEPSIS\", \"LACTA\"       CURRENT SCHEDULED MEDICATIONS   Inpatient Medications     amLODIPine, 10 mg, Oral, Daily    baclofen, 10 mg, Oral, BID    carvedilol, 12.5 mg, Oral, Daily    citalopram, 20 mg, Oral, Daily    gabapentin, 600 mg, Oral, 4x Daily    [START ON 7/4/2024] glipiZIDE, 2.5 mg, Oral, QAM AC    losartan, 25 mg, Oral, Daily    OXcarbazepine, 600 mg, Oral, BID    rOPINIRole, 2 mg, Oral, 4x Daily    sitaGLIPtan-metFORMIN, 1 tablet, Oral, BID WC    tamsulosin, 0.4 mg, Oral, Daily    Tirzepatide, 7.5 mg, SubCUTAneous, Weekly    sodium chloride flush, 5-40 mL, IntraVENous, 2 times per day    acetaminophen, 650 mg, Oral, Q6H    famotidine, 20 mg, Oral, BID **OR** famotidine (PEPCID) injection, 20 mg, IntraVENous, BID    dexAMETHasone, 4 mg, Oral, Q6H    levETIRAcetam, 500 mg, Oral, Q12H    ceFAZolin, 2,000 mg, IntraVENous, Q8H   Infusions    sodium chloride        Antibiotics   Recent Abx Admin                     ceFAZolin (ANCEF) 1,000 mg in sod chloride IRR soln 0.9 % 1,000 mL ()  Given 07/03/24 1320    ceFAZolin (ANCEF) 2,000 mg in sterile water 20 mL IV syringe (mg) 2,000 mg New Bag 07/03/24 1220                       IMPRESSION & PLAN     IMPRESSION:  Patient is a 63 y.o. y/o male who presents s/p elective L microvascular decompression    PLAN:  NEURO:  DOS s/p decompression  Q1H Neuro Checks  Post op antibx - ancef 2g q8h x3 doses  Post op head CT completed  Incisional Care: dry dressing to remain in place until POD 1  PT/OT - advance activity as tolerates     RESPIRATORY:   Continuous pulse oximetry  Keep SP02 >92%  Aspirations precautions - HOB 30    CARDIAC:   Continuous telemetry monitoring  SBP <160  PRN labetalol     GI / :  Goode remove post-op  GI prophylaxis: famotidine

## 2024-07-03 NOTE — PROGRESS NOTES
Wife Jackie called and updated. Stated she spoke to surgeon. Patient more alert following commands states headache is better. Taking ice chips, VSS

## 2024-07-03 NOTE — ANESTHESIA POSTPROCEDURE EVALUATION
Department of Anesthesiology  Postprocedure Note    Patient: Aston Aguilar  MRN: [id number]  YOB: 1960  Date of evaluation: 7/3/2024    Procedure Summary       Date: 07/03/24 Room / Location: [address]    Anesthesia Start: 1213 Anesthesia Stop: 1518    Procedure: LEFT MICROVASCULAR DECOMPRESSIN, POSSIBLE RHIZOTOMY (Left: Head) Diagnosis:       Trigeminal neuralgia      (Trigeminal neuralgia [G50.0])    Surgeons: Franco Helm MD Responsible Provider: Rory Ewing MD    Anesthesia Type: general, TIVA ASA Status: 3            Anesthesia Type: No value filed.    Shweta Phase I: Shweta Score: 8    Shweta Phase II:      Anesthesia Post Evaluation    Patient location during evaluation: PACU  Patient participation: complete - patient participated  Level of consciousness: awake and alert  Airway patency: patent  Nausea & Vomiting: no nausea and no vomiting  Cardiovascular status: hemodynamically stable  Respiratory status: acceptable  Hydration status: euvolemic  Multimodal analgesia pain management approach  Pain management: satisfactory to patient    No notable events documented.

## 2024-07-03 NOTE — ANESTHESIA PRE PROCEDURE
Provider, MD Merrill   dapagliflozin (FARXIGA) 10 MG tablet Take 1 tablet by mouth every morning   Yes ProviderMerrill MD   Tirzepatide (MOUNJARO) 7.5 MG/0.5ML SOPN SC injection Inject 0.5 mLs into the skin once a week   Yes ProviderMerrill MD   nitroGLYCERIN (NITROSTAT) 0.4 MG SL tablet Place 1 tablet under the tongue every 5 minutes as needed for Chest pain up to max of 3 total doses. If no relief after 1 dose, call 911.   Yes Merrill Santana MD   famotidine (PEPCID) 10 MG tablet Take 1 tablet by mouth 2 times daily as needed (acid reflux)   Yes Merrill Santana MD   fluticasone (FLONASE) 50 MCG/ACT nasal spray 1 spray by Nasal route daily    ProviderMerrill MD       Current medications:    Current Facility-Administered Medications   Medication Dose Route Frequency Provider Last Rate Last Admin   • ceFAZolin (ANCEF) 2,000 mg in sterile water 20 mL IV syringe  2,000 mg IntraVENous Once Franco Helm MD       • lactated ringers IV soln infusion   IntraVENous Continuous Rory Ewing MD           Allergies:    Allergies   Allergen Reactions   • Lactose Nausea And Vomiting       Problem List:  There is no problem list on file for this patient.      Past Medical History:        Diagnosis Date   • Broken toes    • CAD (coronary artery disease)    • Cerebral artery occlusion with cerebral infarction (HCC)    • Diabetes mellitus (HCC)    • ED (erectile dysfunction)    • Enlarged prostate    • GERD (gastroesophageal reflux disease)    • Hyperlipidemia    • Hypertension    • PONV (postoperative nausea and vomiting)    • Trigeminal neuralgia    • Urine frequency    • Wears glasses        Past Surgical History:        Procedure Laterality Date   • CEREBRAL MICROVASCULAR DECOMPRESSION Left    • COLONOSCOPY     • CORONARY STENT PLACEMENT  2016    x1 stent   • ENDOSCOPY, COLON, DIAGNOSTIC     • LYMPH NODE BIOPSY Right     groin   • ROTATOR CUFF REPAIR Left 2018   • ROTATOR CUFF

## 2024-07-03 NOTE — PROGRESS NOTES
Patient arrived from OR to PACU # 9 s/p  LEFT MICROVASCULAR DECOMPRESSIN, POSSIBLE RHIZOTOMY (Left: Head) per . Attached to PACU monitoring device, report received from CRNA who stated no problems intraoperatively. Patient arrived restless, disoriented from anesthesia. CRNA @ bedside assisting with patient and repositioning. Medicated per CRNA with dilaudid. Patient unable to follow commands MIRANDA x 4 randomly in bed, VSS.

## 2024-07-03 NOTE — PROGRESS NOTES
4 Eyes Skin Assessment     NAME:  Aston Aguilar  YOB: 1960  MEDICAL RECORD NUMBER:  [id number]    The patient is being assessed for  Admission    I agree that at least one RN has performed a thorough Head to Toe Skin Assessment on the patient. ALL assessment sites listed below have been assessed.      Areas assessed by both nurses:    Head, Face, Ears, Shoulders, Back, Chest, Arms, Elbows, Hands, Sacrum. Buttock, Coccyx, Ischium, Legs. Feet and Heels, and Under Medical Devices         Does the Patient have a Wound? No noted wound(s)       Brian Prevention initiated by RN: No  Wound Care Orders initiated by RN: No    Pressure Injury (Stage 3,4, Unstageable, DTI, NWPT, and Complex wounds) if present, place Wound referral order by RN under : No    New Ostomies, if present place, Ostomy referral order under : No     Nurse 1 eSignature: Electronically signed by Josemanuel Davis RN on 7/3/24 at 6:51 PM EDT    **SHARE this note so that the co-signing nurse can place an eSignature**    Nurse 2 eSignature: Electronically signed by Chrissy Barreto RN on 7/3/24 at 6:52 PM EDT

## 2024-07-03 NOTE — PROGRESS NOTES
Admitted from PACU. Awake, alert, neuro assessment stable. Dressing to left posterior had intact.. Wife at bedside. Denies HA at present.

## 2024-07-04 LAB
ANION GAP SERPL CALCULATED.3IONS-SCNC: 13 MMOL/L (ref 3–16)
APTT BLD: 25.6 SEC (ref 22.1–36.4)
BASOPHILS # BLD: 0 K/UL (ref 0–0.2)
BASOPHILS NFR BLD: 0.2 %
BUN SERPL-MCNC: 24 MG/DL (ref 7–20)
CALCIUM SERPL-MCNC: 8.6 MG/DL (ref 8.3–10.6)
CHLORIDE SERPL-SCNC: 96 MMOL/L (ref 99–110)
CO2 SERPL-SCNC: 19 MMOL/L (ref 21–32)
CREAT SERPL-MCNC: 1.4 MG/DL (ref 0.8–1.3)
DEPRECATED RDW RBC AUTO: 13.7 % (ref 12.4–15.4)
EOSINOPHIL # BLD: 0 K/UL (ref 0–0.6)
EOSINOPHIL NFR BLD: 0 %
GFR SERPLBLD CREATININE-BSD FMLA CKD-EPI: 56 ML/MIN/{1.73_M2}
GLUCOSE BLD-MCNC: 243 MG/DL (ref 70–99)
GLUCOSE BLD-MCNC: 256 MG/DL (ref 70–99)
GLUCOSE BLD-MCNC: 309 MG/DL (ref 70–99)
GLUCOSE BLD-MCNC: 326 MG/DL (ref 70–99)
GLUCOSE SERPL-MCNC: 301 MG/DL (ref 70–99)
HCT VFR BLD AUTO: 39.8 % (ref 40.5–52.5)
HGB BLD-MCNC: 13.5 G/DL (ref 13.5–17.5)
INR PPP: 0.96 (ref 0.85–1.15)
LYMPHOCYTES # BLD: 0.5 K/UL (ref 1–5.1)
LYMPHOCYTES NFR BLD: 4.9 %
MCH RBC QN AUTO: 30.2 PG (ref 26–34)
MCHC RBC AUTO-ENTMCNC: 34 G/DL (ref 31–36)
MCV RBC AUTO: 89 FL (ref 80–100)
MONOCYTES # BLD: 0.4 K/UL (ref 0–1.3)
MONOCYTES NFR BLD: 4.3 %
NEUTROPHILS # BLD: 8.4 K/UL (ref 1.7–7.7)
NEUTROPHILS NFR BLD: 90.6 %
PERFORMED ON: ABNORMAL
PLATELET # BLD AUTO: 284 K/UL (ref 135–450)
PMV BLD AUTO: 8.3 FL (ref 5–10.5)
POTASSIUM SERPL-SCNC: 5.1 MMOL/L (ref 3.5–5.1)
PROTHROMBIN TIME: 13 SEC (ref 11.9–14.9)
RBC # BLD AUTO: 4.47 M/UL (ref 4.2–5.9)
SODIUM SERPL-SCNC: 128 MMOL/L (ref 136–145)
WBC # BLD AUTO: 9.3 K/UL (ref 4–11)

## 2024-07-04 PROCEDURE — 6360000002 HC RX W HCPCS: Performed by: PHYSICIAN ASSISTANT

## 2024-07-04 PROCEDURE — 97530 THERAPEUTIC ACTIVITIES: CPT

## 2024-07-04 PROCEDURE — 6360000002 HC RX W HCPCS: Performed by: NURSE PRACTITIONER

## 2024-07-04 PROCEDURE — 2580000003 HC RX 258: Performed by: INTERNAL MEDICINE

## 2024-07-04 PROCEDURE — 85610 PROTHROMBIN TIME: CPT

## 2024-07-04 PROCEDURE — 2500000003 HC RX 250 WO HCPCS: Performed by: PHYSICIAN ASSISTANT

## 2024-07-04 PROCEDURE — 6370000000 HC RX 637 (ALT 250 FOR IP)

## 2024-07-04 PROCEDURE — 97166 OT EVAL MOD COMPLEX 45 MIN: CPT

## 2024-07-04 PROCEDURE — 97162 PT EVAL MOD COMPLEX 30 MIN: CPT

## 2024-07-04 PROCEDURE — 97116 GAIT TRAINING THERAPY: CPT

## 2024-07-04 PROCEDURE — 6370000000 HC RX 637 (ALT 250 FOR IP): Performed by: PHYSICIAN ASSISTANT

## 2024-07-04 PROCEDURE — 2580000003 HC RX 258: Performed by: PHYSICIAN ASSISTANT

## 2024-07-04 PROCEDURE — 85025 COMPLETE CBC W/AUTO DIFF WBC: CPT

## 2024-07-04 PROCEDURE — 36415 COLL VENOUS BLD VENIPUNCTURE: CPT

## 2024-07-04 PROCEDURE — 2060000000 HC ICU INTERMEDIATE R&B

## 2024-07-04 PROCEDURE — 6360000002 HC RX W HCPCS

## 2024-07-04 PROCEDURE — 85730 THROMBOPLASTIN TIME PARTIAL: CPT

## 2024-07-04 PROCEDURE — 6370000000 HC RX 637 (ALT 250 FOR IP): Performed by: INTERNAL MEDICINE

## 2024-07-04 PROCEDURE — 97535 SELF CARE MNGMENT TRAINING: CPT

## 2024-07-04 PROCEDURE — 80048 BASIC METABOLIC PNL TOTAL CA: CPT

## 2024-07-04 RX ORDER — GLUCAGON 1 MG/ML
1 KIT INJECTION PRN
Status: DISCONTINUED | OUTPATIENT
Start: 2024-07-04 | End: 2024-07-05 | Stop reason: HOSPADM

## 2024-07-04 RX ORDER — INSULIN LISPRO 100 [IU]/ML
0-8 INJECTION, SOLUTION INTRAVENOUS; SUBCUTANEOUS
Status: DISCONTINUED | OUTPATIENT
Start: 2024-07-04 | End: 2024-07-05 | Stop reason: HOSPADM

## 2024-07-04 RX ORDER — DIPHENHYDRAMINE HYDROCHLORIDE 50 MG/ML
12.5 INJECTION INTRAMUSCULAR; INTRAVENOUS EVERY 6 HOURS PRN
Status: DISCONTINUED | OUTPATIENT
Start: 2024-07-04 | End: 2024-07-04

## 2024-07-04 RX ORDER — DEXTROSE MONOHYDRATE 100 MG/ML
INJECTION, SOLUTION INTRAVENOUS CONTINUOUS PRN
Status: DISCONTINUED | OUTPATIENT
Start: 2024-07-04 | End: 2024-07-05 | Stop reason: HOSPADM

## 2024-07-04 RX ORDER — SENNOSIDES A AND B 8.6 MG/1
1 TABLET, FILM COATED ORAL NIGHTLY
Status: DISCONTINUED | OUTPATIENT
Start: 2024-07-04 | End: 2024-07-05 | Stop reason: HOSPADM

## 2024-07-04 RX ORDER — DIPHENHYDRAMINE HYDROCHLORIDE 50 MG/ML
12.5 INJECTION INTRAMUSCULAR; INTRAVENOUS
Status: COMPLETED | OUTPATIENT
Start: 2024-07-04 | End: 2024-07-04

## 2024-07-04 RX ORDER — SODIUM CHLORIDE 9 MG/ML
INJECTION, SOLUTION INTRAVENOUS CONTINUOUS
Status: ACTIVE | OUTPATIENT
Start: 2024-07-04 | End: 2024-07-04

## 2024-07-04 RX ORDER — DIPHENHYDRAMINE HCL 25 MG
50 TABLET ORAL EVERY 6 HOURS PRN
Status: DISCONTINUED | OUTPATIENT
Start: 2024-07-04 | End: 2024-07-05 | Stop reason: HOSPADM

## 2024-07-04 RX ORDER — DIPHENHYDRAMINE HYDROCHLORIDE 50 MG/ML
25 INJECTION INTRAMUSCULAR; INTRAVENOUS EVERY 6 HOURS PRN
Status: DISCONTINUED | OUTPATIENT
Start: 2024-07-04 | End: 2024-07-05 | Stop reason: HOSPADM

## 2024-07-04 RX ADMIN — MORPHINE SULFATE 4 MG: 4 INJECTION INTRAVENOUS at 11:14

## 2024-07-04 RX ADMIN — INSULIN LISPRO 2 UNITS: 100 INJECTION, SOLUTION INTRAVENOUS; SUBCUTANEOUS at 08:14

## 2024-07-04 RX ADMIN — MORPHINE SULFATE 4 MG: 4 INJECTION INTRAVENOUS at 05:56

## 2024-07-04 RX ADMIN — WATER 2000 MG: 1 INJECTION INTRAMUSCULAR; INTRAVENOUS; SUBCUTANEOUS at 12:13

## 2024-07-04 RX ADMIN — FAMOTIDINE 20 MG: 10 INJECTION, SOLUTION INTRAVENOUS at 08:59

## 2024-07-04 RX ADMIN — CITALOPRAM HYDROBROMIDE 20 MG: 20 TABLET ORAL at 09:00

## 2024-07-04 RX ADMIN — DIPHENHYDRAMINE HYDROCHLORIDE 12.5 MG: 50 INJECTION INTRAMUSCULAR; INTRAVENOUS at 12:14

## 2024-07-04 RX ADMIN — DIPHENHYDRAMINE HYDROCHLORIDE 50 MG: 25 TABLET ORAL at 21:31

## 2024-07-04 RX ADMIN — DEXAMETHASONE 4 MG: 4 TABLET ORAL at 17:19

## 2024-07-04 RX ADMIN — FAMOTIDINE 20 MG: 20 TABLET, FILM COATED ORAL at 21:31

## 2024-07-04 RX ADMIN — WATER 2000 MG: 1 INJECTION INTRAMUSCULAR; INTRAVENOUS; SUBCUTANEOUS at 04:25

## 2024-07-04 RX ADMIN — BACLOFEN 10 MG: 10 TABLET ORAL at 09:00

## 2024-07-04 RX ADMIN — GLIPIZIDE 2.5 MG: 5 TABLET ORAL at 05:56

## 2024-07-04 RX ADMIN — LOSARTAN POTASSIUM 25 MG: 25 TABLET, FILM COATED ORAL at 09:00

## 2024-07-04 RX ADMIN — SODIUM CHLORIDE, PRESERVATIVE FREE 10 ML: 5 INJECTION INTRAVENOUS at 09:07

## 2024-07-04 RX ADMIN — CARVEDILOL 12.5 MG: 12.5 TABLET, FILM COATED ORAL at 09:00

## 2024-07-04 RX ADMIN — BACLOFEN 10 MG: 10 TABLET ORAL at 21:31

## 2024-07-04 RX ADMIN — OXYCODONE 10 MG: 5 TABLET ORAL at 09:00

## 2024-07-04 RX ADMIN — ROPINIROLE HYDROCHLORIDE 2 MG: 1 TABLET, FILM COATED ORAL at 12:15

## 2024-07-04 RX ADMIN — GABAPENTIN 600 MG: 600 TABLET, FILM COATED ORAL at 12:15

## 2024-07-04 RX ADMIN — LEVETIRACETAM 500 MG: 500 TABLET, FILM COATED ORAL at 17:19

## 2024-07-04 RX ADMIN — DEXAMETHASONE 4 MG: 4 TABLET ORAL at 06:03

## 2024-07-04 RX ADMIN — TAMSULOSIN HYDROCHLORIDE 0.4 MG: 0.4 CAPSULE ORAL at 08:59

## 2024-07-04 RX ADMIN — ALOGLIPTIN 12.5 MG: 12.5 TABLET, FILM COATED ORAL at 09:00

## 2024-07-04 RX ADMIN — SENNOSIDES 8.6 MG: 8.6 TABLET, FILM COATED ORAL at 21:32

## 2024-07-04 RX ADMIN — OXYCODONE 10 MG: 5 TABLET ORAL at 14:54

## 2024-07-04 RX ADMIN — ROPINIROLE HYDROCHLORIDE 2 MG: 1 TABLET, FILM COATED ORAL at 21:31

## 2024-07-04 RX ADMIN — MORPHINE SULFATE 4 MG: 4 INJECTION INTRAVENOUS at 17:19

## 2024-07-04 RX ADMIN — GABAPENTIN 600 MG: 600 TABLET, FILM COATED ORAL at 08:59

## 2024-07-04 RX ADMIN — DEXAMETHASONE 4 MG: 4 TABLET ORAL at 12:15

## 2024-07-04 RX ADMIN — METFORMIN HYDROCHLORIDE 500 MG: 500 TABLET, FILM COATED ORAL at 08:14

## 2024-07-04 RX ADMIN — ROPINIROLE HYDROCHLORIDE 2 MG: 1 TABLET, FILM COATED ORAL at 17:19

## 2024-07-04 RX ADMIN — ACETAMINOPHEN 650 MG: 325 TABLET ORAL at 05:56

## 2024-07-04 RX ADMIN — ROPINIROLE HYDROCHLORIDE 2 MG: 1 TABLET, FILM COATED ORAL at 08:59

## 2024-07-04 RX ADMIN — INSULIN LISPRO 6 UNITS: 100 INJECTION, SOLUTION INTRAVENOUS; SUBCUTANEOUS at 17:18

## 2024-07-04 RX ADMIN — LEVETIRACETAM 500 MG: 500 TABLET, FILM COATED ORAL at 05:56

## 2024-07-04 RX ADMIN — OXCARBAZEPINE 600 MG: 300 TABLET, FILM COATED ORAL at 21:40

## 2024-07-04 RX ADMIN — MORPHINE SULFATE 4 MG: 4 INJECTION INTRAVENOUS at 01:16

## 2024-07-04 RX ADMIN — OXYCODONE 10 MG: 5 TABLET ORAL at 02:52

## 2024-07-04 RX ADMIN — SODIUM CHLORIDE: 9 INJECTION, SOLUTION INTRAVENOUS at 14:45

## 2024-07-04 RX ADMIN — OXCARBAZEPINE 600 MG: 300 TABLET, FILM COATED ORAL at 08:59

## 2024-07-04 RX ADMIN — MORPHINE SULFATE 4 MG: 4 INJECTION INTRAVENOUS at 21:30

## 2024-07-04 RX ADMIN — GABAPENTIN 600 MG: 600 TABLET, FILM COATED ORAL at 17:18

## 2024-07-04 RX ADMIN — INSULIN LISPRO 3 UNITS: 100 INJECTION, SOLUTION INTRAVENOUS; SUBCUTANEOUS at 12:13

## 2024-07-04 RX ADMIN — DIPHENHYDRAMINE HYDROCHLORIDE 12.5 MG: 50 INJECTION INTRAMUSCULAR; INTRAVENOUS at 02:52

## 2024-07-04 RX ADMIN — GABAPENTIN 600 MG: 600 TABLET, FILM COATED ORAL at 21:31

## 2024-07-04 RX ADMIN — AMLODIPINE BESYLATE 10 MG: 10 TABLET ORAL at 09:00

## 2024-07-04 RX ADMIN — METFORMIN HYDROCHLORIDE 500 MG: 500 TABLET, FILM COATED ORAL at 17:19

## 2024-07-04 ASSESSMENT — PAIN SCALES - GENERAL
PAINLEVEL_OUTOF10: 8
PAINLEVEL_OUTOF10: 8
PAINLEVEL_OUTOF10: 5
PAINLEVEL_OUTOF10: 7
PAINLEVEL_OUTOF10: 4
PAINLEVEL_OUTOF10: 8
PAINLEVEL_OUTOF10: 7
PAINLEVEL_OUTOF10: 5
PAINLEVEL_OUTOF10: 4
PAINLEVEL_OUTOF10: 8
PAINLEVEL_OUTOF10: 5
PAINLEVEL_OUTOF10: 8
PAINLEVEL_OUTOF10: 7

## 2024-07-04 ASSESSMENT — PAIN DESCRIPTION - LOCATION
LOCATION: HEAD
LOCATION: HEAD;BACK
LOCATION: HEAD
LOCATION: HEAD
LOCATION: HEAD;BACK
LOCATION: INCISION
LOCATION: HEAD
LOCATION: BACK;HEAD
LOCATION: HEAD

## 2024-07-04 ASSESSMENT — PAIN DESCRIPTION - ORIENTATION
ORIENTATION: POSTERIOR
ORIENTATION: LEFT
ORIENTATION: POSTERIOR
ORIENTATION: LEFT
ORIENTATION: POSTERIOR
ORIENTATION: LEFT
ORIENTATION: POSTERIOR;LEFT
ORIENTATION: LEFT

## 2024-07-04 ASSESSMENT — PAIN - FUNCTIONAL ASSESSMENT
PAIN_FUNCTIONAL_ASSESSMENT: ACTIVITIES ARE NOT PREVENTED

## 2024-07-04 ASSESSMENT — PAIN SCALES - WONG BAKER
WONGBAKER_NUMERICALRESPONSE: NO HURT

## 2024-07-04 ASSESSMENT — PAIN DESCRIPTION - DESCRIPTORS
DESCRIPTORS: ACHING;PRESSURE
DESCRIPTORS: PRESSURE;ACHING
DESCRIPTORS: ACHING

## 2024-07-04 ASSESSMENT — PAIN DESCRIPTION - FREQUENCY: FREQUENCY: CONTINUOUS

## 2024-07-04 ASSESSMENT — PAIN DESCRIPTION - ONSET: ONSET: GRADUAL

## 2024-07-04 ASSESSMENT — PAIN DESCRIPTION - PAIN TYPE: TYPE: SURGICAL PAIN

## 2024-07-04 NOTE — PROGRESS NOTES
Brief note  Patient seen and examined    No complaints, just feels a little sore.    -Mental status: A&O x4; pleasant & appropriate  -Speech & Language: no aphasia; no dysarthria; follows commands with ease  -Cranial nerves: pupils symmetric; no notable dysconjugate gaze; eyes midline; no facial asymmetry; hearing intact. Shoulder shrug equal. Tongue midline.   -Motor: moving all extremities symmetrically and fully  -Other: no adventitious movements noted  Other Systems  -General Appearance: well-developed, well-nourished, no apparent distress  -Neck: supple  -Lungs: breathing unlabored, regular, no audible wheezes  -CV: pulses strong x4 extremities  -Abd: flat    Post Op HCT  Status post left microvascular decompression without evidence of  complication.    Please call with questions or concerns  No additional recommendations to plan of care as documented by JOHNNY Mitchell earlier today    Yas Orozco NP  Neurocritical Care

## 2024-07-04 NOTE — PLAN OF CARE
Problem: Discharge Planning  Goal: Discharge to home or other facility with appropriate resources  7/4/2024 0917 by Ap Yousif RN  Outcome: Progressing  Flowsheets (Taken 7/4/2024 0917)  Discharge to home or other facility with appropriate resources: Identify barriers to discharge with patient and caregiver  7/4/2024 0610 by Laine Cespedes RN  Outcome: Progressing     Problem: Chronic Conditions and Co-morbidities  Goal: Patient's chronic conditions and co-morbidity symptoms are monitored and maintained or improved  7/4/2024 0917 by Ap Yuosif RN  Outcome: Progressing  Flowsheets (Taken 7/4/2024 0917)  Care Plan - Patient's Chronic Conditions and Co-Morbidity Symptoms are Monitored and Maintained or Improved: Monitor and assess patient's chronic conditions and comorbid symptoms for stability, deterioration, or improvement  7/4/2024 0610 by Laine Cespedes RN  Outcome: Progressing     Problem: Safety - Adult  Goal: Free from fall injury  7/4/2024 0917 by Ap Yousif RN  Outcome: Progressing  7/4/2024 0610 by Laine Cespedes RN  Outcome: Progressing     Problem: Pain  Goal: Verbalizes/displays adequate comfort level or baseline comfort level  7/4/2024 0917 by Ap Yousif RN  Outcome: Progressing  Flowsheets (Taken 7/4/2024 0917)  Verbalizes/displays adequate comfort level or baseline comfort level: Encourage patient to monitor pain and request assistance  7/4/2024 0610 by Laine Cespedes RN  Outcome: Progressing     Problem: ABCDS Injury Assessment  Goal: Absence of physical injury  7/4/2024 0917 by Ap Yousif RN  Outcome: Progressing  Flowsheets (Taken 7/4/2024 0917)  Absence of Physical Injury: Implement safety measures based on patient assessment  7/4/2024 0610 by Laine Cespedes RN  Outcome: Progressing

## 2024-07-04 NOTE — CONSULTS
V2.0  Consult Note      Name:  Aston Aguilar /Age/Sex: 1960  (63 y.o. male)   MRN & CSN:  [id number] & [id number] Encounter Date/Time: 2024 2:08 PM EDT   Location:  [address] [place] [address] PCP: No primary care provider on file.       Hospital Day: 2    Assessment and Plan:   Aston Aguilar is a 63 y.o. male with a pmh of trigeminal neuralgia, essential hypertension, restless leg syndrome, BPH/LUTS, mixed hyperlipidemia, anxiety/depression, muscle spasms, non-insulin-dependent diabetes mellitus, who presents with Trigeminal neuralgia    Hospital Problems             Last Modified POA    * (Principal) Trigeminal neuralgia 7/3/2024 Yes       Trigeminal neuralgia s/p left microvascular decompression.  Patient on dexamethasone 4 mg every 6 hours per neurosurgery.  Continue on gabapentin 600 mg 4 times daily.  Essential hypertension, continue losartan 25 Mg daily, carvedilol 12.5 Mg twice daily, amlodipine 10 Mg daily  Non-insulin-dependent diabetes mellitus, patient on Sitagliptin-metformin  mg twice daily, glimepiride 4 mg twice daily, tirzepatide 3.5 Mg once weekly; patient is currently hyperglycemic likely in the setting of dexamethasone-escalate to medium dose sliding scale, hold glipizide due to risk of hypoglycemia, continue metformin, alogliptin.  POCT glucose checks.  Hypoglycemia treatment orders.  YUDY versus CKD versus YUDY on CKD, creatinine 1.4.  No prior lab works to compare.  Start patient on normal saline infusion 100 mL/h for next 10 hours.  Follow BMP tomorrow morning.  If creatinine continues to trend up, hold metformin and alogliptin, obtain urinary indicis, and consider nephrology consult if needed.  Anxiety/depression, continue citalopram 20 Mg daily  Restless leg syndrome, continue ropinirole 2 mg 4 times daily.  BPH/LUTS, continue tamsulosin 0.4 Mg once daily.    Disposition:     Diet ADULT DIET; Regular   DVT Prophylaxis [] Lovenox, []  Heparin, [] SCDs, [] Ambulation,  [] Eliquis, [] Xarelto   hours.  UA:No results found for: \"NITRU\", \"COLORU\", \"PHUR\", \"LABCAST\", \"WBCUA\", \"RBCUA\", \"MUCUS\", \"TRICHOMONAS\", \"YEAST\", \"BACTERIA\", \"CLARITYU\", \"SPECGRAV\", \"LEUKOCYTESUR\", \"UROBILINOGEN\", \"BILIRUBINUR\", \"BLOODU\", \"GLUCOSEU\", \"KETUA\", \"AMORPHOUS\"  Urine Cultures: No results found for: \"LABURIN\"  Blood Cultures: No results found for: \"BC\"  No results found for: \"BLOODCULT2\"  Organism: No results found for: \"ORG\"    Imaging/Diagnostics Last 24 Hours   CT Head wo Contrast    Result Date: 7/3/2024  PROCEDURE: CT head without contrast INDICATION: post op; COMPARISON: None. TECHNIQUE: CT head was performed without contrast according to standard protocol. Axial images and multiplanar reformatted images reviewed. Up-to-date CT equipment and radiation dose reduction techniques were employed. FINDINGS: Postoperative appearance of left retrosigmoid craniotomy for left microvascular decompression demonstrates small volume postoperative pneumocephalus and no evidence of acute hemorrhage. There is mild cerebral volume loss with associated ventricular dilatation. The basilar cisterns are patent. No mass effect or midline shift is seen. The gray-white matter differentiation is normal. No cerebral density abnormality is seen. Visualized portions of the orbits, paranasal sinuses, and mastoids appear normal. No acute fracture is identified.     1.  Status post left microvascular decompression without evidence of complication. Electronically signed by Rui Calles          Electronically signed by Ivan Wright MD on 7/4/2024 at 2:08 PM      Comment: Please note this report has been produced using speech recognition software and may contain errors related to that system including errors in grammar, punctuation, and spelling, as well as words and phrases that may be inappropriate. If there are any questions or concerns please feel free to contact the dictating provider for clarification.

## 2024-07-04 NOTE — PROGRESS NOTES
Physical Therapy  Facility/Department: [facility] ICU  Physical Therapy Initial Assessment and treatment    Name: Aston Aguilar  : 1960  MRN: [id number]  Date of Service: 2024    Discharge Recommendations:  24 hour supervision or assist, Home with Home health PT   PT Equipment Recommendations  Other: continue to assess; patient has rollator at home but may need equipment for d/c to motel and for navigation of airport      Patient Diagnosis(es): There were no encounter diagnoses.  Past Medical History:  has a past medical history of Broken toes, CAD (coronary artery disease), Cerebral artery occlusion with cerebral infarction (HCC), Diabetes mellitus (HCC), ED (erectile dysfunction), Enlarged prostate, GERD (gastroesophageal reflux disease), Hyperlipidemia, Hypertension, PONV (postoperative nausea and vomiting), Trigeminal neuralgia, Urine frequency, and Wears glasses.  Past Surgical History:  has a past surgical history that includes Tonsillectomy; Cerebral microvascular decompression (Left); Colonoscopy; Endoscopy, colon, diagnostic; Rotator cuff repair (Left, ); Rotator cuff repair (Right, ); Coronary stent placement (); skin biopsy; and lymph node biopsy (Right).    Assessment   Body Structures, Functions, Activity Limitations Requiring Skilled Therapeutic Intervention: Decreased functional mobility ;Decreased endurance;Decreased balance;Increased pain  Assessment: Patient tolerated session well, transferring and ambulating in room and hallways as above with fairly steady gait using FWW.  Patient reports mild dizziness with ambulation which is baseline due to trigeminal neuralgia, improved once sitting in bedside chair.  Patient here from [place] where he hopes to return via flight on Saturday.  If discharging prior to that, he will be returning to [facility] with his wife.  Patient reports he may obtain a cane to use for walking short distance into hotel and in airport prior to getting wheelchair.   transfers sit<>stand with supervision  Short Term Goal 3: patient will ambulate 350' with least restrictive assistive device and supervision  Patient Goals   Patient Goals : to go home       Education  Patient Education  Education Given To: Patient  Education Provided: Role of Therapy;Plan of Care;Transfer Training;Fall Prevention Strategies  Education Method: Verbal  Barriers to Learning: None  Education Outcome: Verbalized understanding;Continued education needed      Therapy Time   Individual Concurrent Group Co-treatment   Time In 0858         Time Out 0942         Minutes 44         Timed Code Treatment Minutes: 29 Minutes   Timed Code Treatment Minutes:  29 Minutes    Total Treatment Minutes:    29 minutes treatment + 15 minutes evaluation = 44 total treatment minutes    If patient discharges prior to next session this note will serve as a discharge summary.  Please see below for the latest assessment towards goals.       Mariann Gaytan, [place] [id number]

## 2024-07-04 NOTE — PROGRESS NOTES
Neurosurgery Progress Note    2024 12:06 PM                               Aston Aguilar                      LOS: 1 day               Subjective:  No acute events overnight. Patient has no specific complaints this am.                                                 Physical Exam:    Temp (24hrs), Av.4 °F (36.3 °C), Min:96.9 °F (36.1 °C), Max:97.8 °F (36.6 °C)      Neuro Exam  Alert and oriented x 4  PERRL, EOMI, 3->2  mm OU, visual fields grossly intact  Facial expression and sensation symmetric  Tongue and uvula midline  Shoulder shrug symmetric     GCS:    E 4       Opens eyes   V 5 A&Ox3, appropriate, conversant      M 6        MIRANDA with 5/5 strength on command. No PD.   Dressing clean and dry.     Labs:  Recent Labs     24  0452   WBC 9.3   HGB 13.5   HCT 39.8*          Recent Labs     24  0452   *   K 5.1   CL 96*   CO2 19*   BUN 24*   CREATININE 1.4*   GLUCOSE 301*   CALCIUM 8.6       Recent Labs     24  0452   PROTIME 13.0   INR 0.96   APTT 25.6         Assessment and Plan:  Pt is a 63 y.o. year old male with trigeminal neuralgia, POD#1 s/p redo MVD (prior surgery in [place])    Neurologically stable  ADAT, mobilze, continue pain control  DVT prophylaxis, SCDs  PT/OT  Ok to transfer to floor.  Likely d/c tomorrow      EN Azul MD  2024 12:06 PM

## 2024-07-04 NOTE — PROGRESS NOTES
Occupational Therapy  Facility/Department: [facility] ICU  Occupational Therapy Initial Assessment and Tx    Name: Aston Aguilar  : 1960  MRN: [id number]  Date of Service: 2024    Discharge Recommendations:  24 hour supervision or assist  OT Equipment Recommendations  Equipment Needed: No       Patient Diagnosis(es): There were no encounter diagnoses.  Past Medical History:  has a past medical history of Broken toes, CAD (coronary artery disease), Cerebral artery occlusion with cerebral infarction (HCC), Diabetes mellitus (HCC), ED (erectile dysfunction), Enlarged prostate, GERD (gastroesophageal reflux disease), Hyperlipidemia, Hypertension, PONV (postoperative nausea and vomiting), Trigeminal neuralgia, Urine frequency, and Wears glasses.  Past Surgical History:  has a past surgical history that includes Tonsillectomy; Cerebral microvascular decompression (Left); Colonoscopy; Endoscopy, colon, diagnostic; Rotator cuff repair (Left, ); Rotator cuff repair (Right, ); Coronary stent placement (2016); skin biopsy; and lymph node biopsy (Right).           Assessment   Performance deficits / Impairments: Decreased functional mobility ;Decreased balance;Decreased ADL status;Decreased high-level IADLs;Decreased endurance  Assessment: Pt is 63 y.o male who presents from home now s/p OR for L mircrovascular decompression. Pt presents slightly below baseline and demos decreased activity tolerance, global deconditioning, and decreased balance impacting ADL/IADL performance. Pt requires CGA for functional transfers, functional mobility and ADLs. Anticipate pt will make good progress towards therapy goals. Pt reports no concerns for return home at [place]. Pt will benefit from continued skilled OT while in acute setting to address above deficits and progress towards PLOF.  Prognosis: Good  Decision Making: Medium Complexity  REQUIRES OT FOLLOW-UP: Yes  Activity Tolerance  Activity Tolerance: Patient Tolerated treatment  assistance  Interventions: Verbal cues;Safety awareness training  Supine to Sit: Stand-by assistance;Additional time (HOB elevated)  Scooting: Stand-by assistance  Balance  Sitting: Intact (SBA)  Standing: High guard (CGA)  Transfer Training  Transfer Training: Yes  Overall Level of Assistance: Contact-guard assistance  Interventions: Verbal cues;Safety awareness training (RW; v/cues for hand placement/safety)  Sit to Stand: Contact-guard assistance  Stand to Sit: Contact-guard assistance  Toilet Transfer: Contact-guard assistance (grab bar + RW)  Gait  Gait Training: Yes  Overall Level of Assistance: Contact-guard assistance  Assistive Device: Gait belt;Walker, rolling  Interventions: Verbal cues;Safety awareness training     AROM: Within functional limits  PROM: Within functional limits  Strength: Within functional limits  Coordination: Within functional limits  Tone: Normal  Sensation: Intact  ADL  Grooming: Contact guard assistance  Grooming Skilled Clinical Factors: in stance at sink for hand hygiene  UE Dressing: Stand by assistance;Setup  UE Dressing Skilled Clinical Factors: to don tshirt, doff gown  LE Dressing: Contact guard assistance  LE Dressing Skilled Clinical Factors: to don undergarments/shorts  Toileting: Contact guard assistance                 Cognition  Overall Cognitive Status: WFL  Orientation  Overall Orientation Status: Within Normal Limits                  Education Given To: Patient;Family  Education Provided: Role of Therapy;Transfer Training;Plan of Care;ADL Adaptive Strategies;Fall Prevention Strategies  Education Method: Verbal  Barriers to Learning: None  Education Outcome: Verbalized understanding                      AM-PAC - ADL  AM-PAC Daily Activity - Inpatient   How much help is needed for putting on and taking off regular lower body clothing?: A Little  How much help is needed for bathing (which includes washing, rinsing, drying)?: A Little  How much help is needed for

## 2024-07-04 NOTE — PLAN OF CARE
Patient's exam remains stable. Ok to transfer out of ICU. Neuro checks changed to q4h. Hospitalist consult placed for post-op medical management. NCC will sign off at this time. Please call with any questions.    KATHRINE Zavaleta - CNP   Neurology & Neurocritical Care   7/4/2024 12:54 PM    ICU Patients:   Neurocritical Care Line: [phone]  Perfect[employer]: [facility] Neurocritical [facility]

## 2024-07-04 NOTE — PROGRESS NOTES
Goode removed per protocol/. Katerine care completed. Urinal placed at bedside.     Laine Cespedes RN

## 2024-07-05 VITALS
SYSTOLIC BLOOD PRESSURE: 141 MMHG | HEIGHT: 73 IN | WEIGHT: 230 LBS | HEART RATE: 67 BPM | OXYGEN SATURATION: 98 % | DIASTOLIC BLOOD PRESSURE: 72 MMHG | RESPIRATION RATE: 16 BRPM | BODY MASS INDEX: 30.48 KG/M2 | TEMPERATURE: 97.8 F

## 2024-07-05 PROBLEM — Z98.890 S/P CRANIOTOMY: Status: ACTIVE | Noted: 2024-07-05

## 2024-07-05 LAB
ANION GAP SERPL CALCULATED.3IONS-SCNC: 12 MMOL/L (ref 3–16)
BUN SERPL-MCNC: 29 MG/DL (ref 7–20)
CALCIUM SERPL-MCNC: 8.8 MG/DL (ref 8.3–10.6)
CHLORIDE SERPL-SCNC: 98 MMOL/L (ref 99–110)
CO2 SERPL-SCNC: 23 MMOL/L (ref 21–32)
CREAT SERPL-MCNC: 1.3 MG/DL (ref 0.8–1.3)
GFR SERPLBLD CREATININE-BSD FMLA CKD-EPI: 61 ML/MIN/{1.73_M2}
GLUCOSE BLD-MCNC: 218 MG/DL (ref 70–99)
GLUCOSE BLD-MCNC: 303 MG/DL (ref 70–99)
GLUCOSE SERPL-MCNC: 176 MG/DL (ref 70–99)
PERFORMED ON: ABNORMAL
PERFORMED ON: ABNORMAL
POTASSIUM SERPL-SCNC: 4.5 MMOL/L (ref 3.5–5.1)
SODIUM SERPL-SCNC: 133 MMOL/L (ref 136–145)

## 2024-07-05 PROCEDURE — 6370000000 HC RX 637 (ALT 250 FOR IP): Performed by: INTERNAL MEDICINE

## 2024-07-05 PROCEDURE — 99238 HOSP IP/OBS DSCHRG MGMT 30/<: CPT | Performed by: NURSE PRACTITIONER

## 2024-07-05 PROCEDURE — 80048 BASIC METABOLIC PNL TOTAL CA: CPT

## 2024-07-05 PROCEDURE — 6360000002 HC RX W HCPCS: Performed by: PHYSICIAN ASSISTANT

## 2024-07-05 PROCEDURE — 6370000000 HC RX 637 (ALT 250 FOR IP): Performed by: PHYSICIAN ASSISTANT

## 2024-07-05 PROCEDURE — 36415 COLL VENOUS BLD VENIPUNCTURE: CPT

## 2024-07-05 PROCEDURE — 2580000003 HC RX 258: Performed by: PHYSICIAN ASSISTANT

## 2024-07-05 RX ORDER — INSULIN GLARGINE 100 [IU]/ML
5 INJECTION, SOLUTION SUBCUTANEOUS DAILY
Status: DISCONTINUED | OUTPATIENT
Start: 2024-07-05 | End: 2024-07-05 | Stop reason: HOSPADM

## 2024-07-05 RX ORDER — CLOPIDOGREL BISULFATE 75 MG/1
75 TABLET ORAL DAILY
Qty: 30 TABLET | Refills: 3 | Status: SHIPPED | OUTPATIENT
Start: 2024-07-17

## 2024-07-05 RX ORDER — LEVETIRACETAM 500 MG/1
500 TABLET ORAL EVERY 12 HOURS
Qty: 24 TABLET | Refills: 0 | Status: SHIPPED | OUTPATIENT
Start: 2024-07-05 | End: 2024-07-17

## 2024-07-05 RX ORDER — OXYCODONE HYDROCHLORIDE 5 MG/1
5 TABLET ORAL EVERY 6 HOURS PRN
Qty: 28 TABLET | Refills: 0 | Status: SHIPPED | OUTPATIENT
Start: 2024-07-05 | End: 2024-07-12

## 2024-07-05 RX ORDER — SENNOSIDES A AND B 8.6 MG/1
1 TABLET, FILM COATED ORAL NIGHTLY
Qty: 10 TABLET | Refills: 0 | Status: SHIPPED | OUTPATIENT
Start: 2024-07-05 | End: 2024-07-15

## 2024-07-05 RX ORDER — DEXAMETHASONE 4 MG/1
TABLET ORAL
Qty: 22 TABLET | Refills: 0 | Status: SHIPPED | OUTPATIENT
Start: 2024-07-05 | End: 2024-07-15

## 2024-07-05 RX ADMIN — TAMSULOSIN HYDROCHLORIDE 0.4 MG: 0.4 CAPSULE ORAL at 08:31

## 2024-07-05 RX ADMIN — INSULIN GLARGINE 5 UNITS: 100 INJECTION, SOLUTION SUBCUTANEOUS at 08:34

## 2024-07-05 RX ADMIN — CARVEDILOL 12.5 MG: 12.5 TABLET, FILM COATED ORAL at 08:32

## 2024-07-05 RX ADMIN — MORPHINE SULFATE 4 MG: 4 INJECTION INTRAVENOUS at 08:40

## 2024-07-05 RX ADMIN — LEVETIRACETAM 500 MG: 500 TABLET, FILM COATED ORAL at 06:53

## 2024-07-05 RX ADMIN — BACLOFEN 10 MG: 10 TABLET ORAL at 08:32

## 2024-07-05 RX ADMIN — GABAPENTIN 600 MG: 600 TABLET, FILM COATED ORAL at 08:31

## 2024-07-05 RX ADMIN — DEXAMETHASONE 4 MG: 4 TABLET ORAL at 12:07

## 2024-07-05 RX ADMIN — OXYCODONE 10 MG: 5 TABLET ORAL at 12:07

## 2024-07-05 RX ADMIN — FAMOTIDINE 20 MG: 20 TABLET, FILM COATED ORAL at 08:31

## 2024-07-05 RX ADMIN — CITALOPRAM HYDROBROMIDE 20 MG: 20 TABLET ORAL at 08:31

## 2024-07-05 RX ADMIN — DEXAMETHASONE 4 MG: 4 TABLET ORAL at 06:53

## 2024-07-05 RX ADMIN — AMLODIPINE BESYLATE 10 MG: 10 TABLET ORAL at 08:32

## 2024-07-05 RX ADMIN — METFORMIN HYDROCHLORIDE 500 MG: 500 TABLET, FILM COATED ORAL at 08:31

## 2024-07-05 RX ADMIN — MORPHINE SULFATE 4 MG: 4 INJECTION INTRAVENOUS at 03:20

## 2024-07-05 RX ADMIN — INSULIN LISPRO 6 UNITS: 100 INJECTION, SOLUTION INTRAVENOUS; SUBCUTANEOUS at 12:08

## 2024-07-05 RX ADMIN — OXCARBAZEPINE 600 MG: 300 TABLET, FILM COATED ORAL at 08:38

## 2024-07-05 RX ADMIN — SODIUM CHLORIDE, PRESERVATIVE FREE 10 ML: 5 INJECTION INTRAVENOUS at 08:38

## 2024-07-05 RX ADMIN — DIPHENHYDRAMINE HYDROCHLORIDE 50 MG: 25 TABLET ORAL at 03:21

## 2024-07-05 RX ADMIN — DEXAMETHASONE 4 MG: 4 TABLET ORAL at 00:07

## 2024-07-05 RX ADMIN — ROPINIROLE HYDROCHLORIDE 2 MG: 1 TABLET, FILM COATED ORAL at 08:31

## 2024-07-05 RX ADMIN — LOSARTAN POTASSIUM 25 MG: 25 TABLET, FILM COATED ORAL at 08:32

## 2024-07-05 RX ADMIN — INSULIN LISPRO 2 UNITS: 100 INJECTION, SOLUTION INTRAVENOUS; SUBCUTANEOUS at 08:33

## 2024-07-05 RX ADMIN — ALOGLIPTIN 12.5 MG: 12.5 TABLET, FILM COATED ORAL at 08:38

## 2024-07-05 ASSESSMENT — PAIN DESCRIPTION - FREQUENCY
FREQUENCY: CONTINUOUS

## 2024-07-05 ASSESSMENT — PAIN DESCRIPTION - LOCATION
LOCATION: HEAD;BACK
LOCATION: INCISION;FACE
LOCATION: FACE;BACK

## 2024-07-05 ASSESSMENT — PAIN DESCRIPTION - ORIENTATION
ORIENTATION: LEFT
ORIENTATION: LEFT;MID
ORIENTATION: LEFT;MID

## 2024-07-05 ASSESSMENT — PAIN SCALES - GENERAL
PAINLEVEL_OUTOF10: 3
PAINLEVEL_OUTOF10: 7
PAINLEVEL_OUTOF10: 4
PAINLEVEL_OUTOF10: 7
PAINLEVEL_OUTOF10: 7
PAINLEVEL_OUTOF10: 5

## 2024-07-05 ASSESSMENT — PAIN DESCRIPTION - ONSET
ONSET: GRADUAL

## 2024-07-05 ASSESSMENT — PAIN DESCRIPTION - PAIN TYPE
TYPE: SURGICAL PAIN

## 2024-07-05 ASSESSMENT — PAIN - FUNCTIONAL ASSESSMENT
PAIN_FUNCTIONAL_ASSESSMENT: ACTIVITIES ARE NOT PREVENTED
PAIN_FUNCTIONAL_ASSESSMENT: PREVENTS OR INTERFERES SOME ACTIVE ACTIVITIES AND ADLS
PAIN_FUNCTIONAL_ASSESSMENT: ACTIVITIES ARE NOT PREVENTED

## 2024-07-05 ASSESSMENT — PAIN DESCRIPTION - DESCRIPTORS
DESCRIPTORS: ACHING;DISCOMFORT
DESCRIPTORS: DISCOMFORT;THROBBING
DESCRIPTORS: DISCOMFORT

## 2024-07-05 NOTE — CONSULTS
V2.0  Consult Note      Name:  Aston Aguilar /Age/Sex: 1960  (63 y.o. male)   MRN & CSN:  [id number] & [id number] Encounter Date/Time: 2024 2:08 PM EDT   Location:  [address] [place] [address] PCP: No primary care provider on file.       Hospital Day: 3    Assessment and Plan:   Aston Aguilar is a 63 y.o. male with a pmh of trigeminal neuralgia, essential hypertension, restless leg syndrome, BPH/LUTS, mixed hyperlipidemia, anxiety/depression, muscle spasms, non-insulin-dependent diabetes mellitus, who presents with Trigeminal neuralgia    Hospital Problems             Last Modified POA    * (Principal) Trigeminal neuralgia 7/3/2024 Yes     Trigeminal neuralgia s/p left microvascular decompression.  Patient on dexamethasone 4 mg every 6 hours per neurosurgery.  Continue on gabapentin 600 mg 4 times daily.  Essential hypertension, continue losartan 25 Mg daily, carvedilol 12.5 Mg twice daily, amlodipine 10 Mg daily  Non-insulin-dependent diabetes mellitus, home medication includes Sitagliptin-metformin  mg twice daily, glimepiride 4 mg twice daily, tirzepatide 3.5 Mg once weekly; patient is currently hyperglycemic likely in the setting of dexamethasone - escalated to medium dose sliding scale, held glipizide due to risk of hypoglycemia, continue metformin, alogliptin. Add lantus 5 units daily.  POCT glucose checks.  Hypoglycemia treatment orders. Do not discharge on insulin. Resume home medication at discharge.   YUDY versus CKD versus YUDY on CKD, creatinine 1.4.  No prior lab works to compare.  Gave patient normal saline infusion 100 mL/h for 10 hours.  BMP today morning with stable creatinine of 1.3.  No need for further fluid. Creatinine is likely at baseline.   Anxiety/depression, continue citalopram 20 Mg daily.  Restless leg syndrome, continue ropinirole 2 mg 4 times daily.  BPH/LUTS, continue tamsulosin 0.4 Mg once daily.    Disposition:     Diet ADULT DIET; Regular   DVT Prophylaxis [] Lovenox, []  Heparin,  [] SCDs, [] Ambulation,  [] Eliquis, [] Xarelto  [] Coumadin   Peptic ulcer prophylaxis Famotidine   Code Status Full Code   Disposition From / Current living situation : Home  Expected Disposition: Home  Estimated Date of Discharge: Per primary team. From internal medicine standpoint patient is stable for discharge.     Surrogate Decision Maker/ POA Jackie Aguilar (spouse)     Goals status was discussed in detail with patient.  Verbalized understanding of different options of CODE STATUS.  Patient opted for full code.    History from:     patient    History of Present Illness:     Chief Complaint: Trigeminal neuralgia  Aston Aguilar is a 63 y.o. male with pmh of trigeminal neuralgia, essential hypertension, restless leg syndrome, BPH/LUTS, mixed hyperlipidemia, anxiety/depression, muscle spasms, non-insulin-dependent diabetes mellitus, who presents with was admitted for elective left microvascular decompression for trigeminal neuralgia.  Patient underwent surgery and tolerated well.  Internal medicine team were consulted for medical management postoperatively.  Patient seen and evaluated at bedside.  Past medical history reviewed.  Reviewed medications.        Personally reviewed Lab Studies and Imaging          Review of Systems: Need 10 Elements   Review of Systems    Negative unless mentioned    Objective:     Intake/Output Summary (Last 24 hours) at 7/5/2024 0800  Last data filed at 7/5/2024 0005  Gross per 24 hour   Intake 1350 ml   Output 550 ml   Net 800 ml        Vitals:   Vitals:    07/05/24 0005 07/05/24 0318 07/05/24 0350 07/05/24 0733   BP: (!) 111/55 (!) 161/74     Pulse: 65 74     Resp: 17 20 19    Temp: 97.7 °F (36.5 °C) 97.4 °F (36.3 °C)     TempSrc: Oral Oral     SpO2: 96% 100%     Weight:    104.3 kg (230 lb)   Height:           Medications Prior to Admission     Prior to Admission medications    Medication Sig Start Date End Date Taking? Authorizing Provider   zolpidem (AMBIEN) 10 MG tablet Take 1  results found for: \"CHOL\", \"HDL\", \"TRIG\"  Hemoglobin A1C: No results found for: \"LABA1C\"  TSH: No results found for: \"TSH\"  Troponin: No results found for: \"TROPONINT\"  Lactic Acid: No results for input(s): \"LACTA\" in the last 72 hours.  BNP: No results for input(s): \"PROBNP\" in the last 72 hours.  UA:No results found for: \"NITRU\", \"COLORU\", \"PHUR\", \"LABCAST\", \"WBCUA\", \"RBCUA\", \"MUCUS\", \"TRICHOMONAS\", \"YEAST\", \"BACTERIA\", \"CLARITYU\", \"SPECGRAV\", \"LEUKOCYTESUR\", \"UROBILINOGEN\", \"BILIRUBINUR\", \"BLOODU\", \"GLUCOSEU\", \"KETUA\", \"AMORPHOUS\"  Urine Cultures: No results found for: \"LABURIN\"  Blood Cultures: No results found for: \"BC\"  No results found for: \"BLOODCULT2\"  Organism: No results found for: \"ORG\"    Imaging/Diagnostics Last 24 Hours   CT Head wo Contrast    Result Date: 7/3/2024  PROCEDURE: CT head without contrast INDICATION: post op; COMPARISON: None. TECHNIQUE: CT head was performed without contrast according to standard protocol. Axial images and multiplanar reformatted images reviewed. Up-to-date CT equipment and radiation dose reduction techniques were employed. FINDINGS: Postoperative appearance of left retrosigmoid craniotomy for left microvascular decompression demonstrates small volume postoperative pneumocephalus and no evidence of acute hemorrhage. There is mild cerebral volume loss with associated ventricular dilatation. The basilar cisterns are patent. No mass effect or midline shift is seen. The gray-white matter differentiation is normal. No cerebral density abnormality is seen. Visualized portions of the orbits, paranasal sinuses, and mastoids appear normal. No acute fracture is identified.     1.  Status post left microvascular decompression without evidence of complication. Electronically signed by Rui Calles          Electronically signed by Ivan Wright MD on 7/5/2024 at 8:00 AM      Comment: Please note this report has been produced using speech recognition software and may contain errors

## 2024-07-05 NOTE — PROGRESS NOTES
Reviewed discharge instructions with patient and  spouse.  Reviewed discharge medications including dosing, schedule, indication, and adverse reactions.  Reviewed which medications were already taken today and next dosage due for each medication.      Patient verbalized understanding of all instructions and questions were answered to his. satisfaction.  Patient discharged to home per wheelchair with spouse.      Sharmila Viramontes RN

## 2024-07-05 NOTE — PROGRESS NOTES
Occupational Therapy  Attempt Treatment    Chart reviewed. RN consulted. Per RN, Pt in the d/c process and just waiting on transport to arrive. Will follow up per POC if necessary. Cont with POC    Nakul VELARDE

## 2024-07-05 NOTE — DISCHARGE SUMMARY
Discharge Summary    Date of Admission: 7/3/2024 10:12 AM  Date of Discharge: 7/5/2024  Admission Diagnosis: Trigeminal neuralgia [G50.0]  Discharge Diagnosis: Same   Condition on Discharge: good  Attending for Admission: Franco Helm MD  Procedures: Procedure(s) (LRB):  LEFT MICROVASCULAR DECOMPRESSIN, POSSIBLE RHIZOTOMY (Left)  Consults: IP CONSULT TO NEUROCRITICAL CARE  IP CONSULT TO HOSPITALIST    Reason for Admission:  Aston Aguilar is a 63 y.o. male patient who was admitted to the hospital for complaints of facial pain. He underwent the procedure listed above on 7/3/2024.     Hospital Course:  After surgery, His pre-operative facial pain was Absent . He complained of surgical incision pain. The pain was well-controlled on oral medications.  The incision was clean, dry and intact. There was no erythema or edema around the surgical site. Prior to discharge He was eating well, urinating and ambulating with a steady gait.    Discharge Vitals/Labs:  BP (!) 168/86   Pulse 62   Temp 97.7 °F (36.5 °C) (Oral)   Resp 18   Ht 1.854 m (6' 1\")   Wt 104.3 kg (230 lb)   SpO2 99%   BMI 30.34 kg/m²   CBC with Differential:    Lab Results   Component Value Date/Time    WBC 9.3 07/04/2024 04:52 AM    RBC 4.47 07/04/2024 04:52 AM    HGB 13.5 07/04/2024 04:52 AM    HCT 39.8 07/04/2024 04:52 AM     07/04/2024 04:52 AM    MCV 89.0 07/04/2024 04:52 AM    MCH 30.2 07/04/2024 04:52 AM    MCHC 34.0 07/04/2024 04:52 AM    RDW 13.7 07/04/2024 04:52 AM    LYMPHOPCT 4.9 07/04/2024 04:52 AM    MONOPCT 4.3 07/04/2024 04:52 AM    EOSPCT 0.0 07/04/2024 04:52 AM    BASOPCT 0.2 07/04/2024 04:52 AM    MONOSABS 0.4 07/04/2024 04:52 AM    LYMPHSABS 0.5 07/04/2024 04:52 AM    EOSABS 0.0 07/04/2024 04:52 AM    BASOSABS 0.0 07/04/2024 04:52 AM       Discharge Medications:  The patient suffers from a major neurological surgery that pain cannot be managed within an average of 30 MED per day. Severe acute postoperative pain is  the reason for exceeding the 30 MED average, and the prescription reflects the same dosage patient received while inpatient, which is the lowest dose consistent with the patient’s medical condition. Non-opioid treatment options have been considered prior to prescribing opioids, and the patient has been advised of the benefits and risks of the opioid (including the potential for addiction).     Medication List        START taking these medications      dexAMETHasone 4 MG tablet  Commonly known as: DECADRON  Take 1 tablet by mouth every 6 hours for 1 day, THEN 1 tablet every 8 (eight) hours for 3 days, THEN 1 tablet every 12 hours for 3 days, THEN 1 tablet every 24 hours for 3 days.  Start taking on: July 5, 2024     levETIRAcetam 500 MG tablet  Commonly known as: KEPPRA  Take 1 tablet by mouth in the morning and 1 tablet in the evening. Do all this for 12 days.     oxyCODONE 5 MG immediate release tablet  Commonly known as: ROXICODONE  Take 1 tablet by mouth every 6 hours as needed for Pain for up to 7 days. Max Daily Amount: 20 mg     senna 8.6 MG tablet  Commonly known as: SENOKOT  Take 1 tablet by mouth nightly for 10 days            CHANGE how you take these medications      clopidogrel 75 MG tablet  Commonly known as: PLAVIX  Take 1 tablet by mouth daily  Start taking on: July 17, 2024  What changed: These instructions start on July 17, 2024. If you are unsure what to do until then, ask your doctor or other care provider.            CONTINUE taking these medications      amLODIPine 10 MG tablet  Commonly known as: NORVASC     baclofen 10 MG tablet  Commonly known as: LIORESAL     carvedilol 12.5 MG tablet  Commonly known as: COREG     CeleXA 20 MG tablet  Generic drug: citalopram     famotidine 10 MG tablet  Commonly known as: PEPCID     Farxiga 10 MG tablet  Generic drug: dapagliflozin     fluticasone 50 MCG/ACT nasal spray  Commonly known as: FLONASE     gabapentin 600 MG tablet  Commonly known as: NEURONTIN

## 2024-07-05 NOTE — PLAN OF CARE
Problem: Discharge Planning  Goal: Discharge to home or other facility with appropriate resources  Outcome: Progressing  Pt discharged home this shift.     Problem: Chronic Conditions and Co-morbidities  Goal: Patient's chronic conditions and co-morbidity symptoms are monitored and maintained or improved  Outcome: Progressing  Pt able to complete neuro checks without difficulty. Pt reports some loss of strength of the left side due to a previous stroke. Removed dressing on pt sutures and cleaned per MD instruction. No drainage noted.    Problem: Safety - Adult  Goal: Free from fall injury  7/5/2024 1309 by Sharmila Viramontes, RN  Outcome: Progressing  Pt up with assist x1 w/ walker and gait belt, call light within reach, bed alarm in use    Problem: ABCDS Injury Assessment  Goal: Absence of physical injury  Outcome: Progressing  Pt up with assist x1 w/ walker and gait belt, call light within reach, bed alarm in use     Problem: Pain  Goal: Verbalizes/displays adequate comfort level or baseline comfort level  7/5/2024 1309 by Sharmila Viramontes, RN  Outcome: Progressing  Minor complaints of surgical pain this shift. Oxy x1 and morphine x1.

## 2024-07-05 NOTE — CARE COORDINATION
Case Management Assessment  Initial Evaluation    Date/Time of Evaluation: 7/5/2024 8:21 AM  Assessment Completed by: GALLO Santa   for [facility] ([facility])  [employer]: [phone]    If patient is discharged prior to next notation, then this note serves as note for discharge by case management.    Patient Name: Aston Aguilar                   YOB: 1960  Diagnosis: Trigeminal neuralgia [G50.0]                   Date / Time: 7/3/2024 10:12 AM    Patient Admission Status: Inpatient   Readmission Risk (Low < 19, Mod (19-27), High > 27): Readmission Risk Score: 10    Current PCP: No primary care provider on file.  PCP verified by CM? Yes    Chart Reviewed: Yes      History Provided by: Patient  Patient Orientation: Alert and Oriented    Patient Cognition: Alert    Hospitalization in the last 30 days (Readmission):  No    If yes, Readmission Assessment in CM Navigator will be completed.    Advance Directives:      Code Status: Full Code   Patient's Primary Decision Maker is: Legal Next of Kin      Discharge Planning:    Patient lives with: Spouse/Significant Other Type of Home: House  Primary Care Giver: Self  Patient Support Systems include: Spouse/Significant Other   Current Financial resources: Medicare  Current community resources: None  Current services prior to admission: Durable Medical Equipment            Current DME: [facility]            Type of Home Care services:  None    ADLS  Prior functional level: Independent in ADLs/IADLs  Current functional level: Independent in ADLs/IADLs    PT AM-PAC: 18 /24  OT AM-PAC: 20 /24    Family can provide assistance at DC: Yes  Would you like Case Management to discuss the discharge plan with any other family members/significant others, and if so, who? Yes  Plans to Return to Present Housing: Yes  Other Identified Issues/Barriers to RETURNING to current housing: n/a  Potential Assistance needed  at discharge: N/A            Potential DME:    Patient expects to discharge to: House  Plan for transportation at discharge:      Financial    Payor: MEDICARE / Plan: MEDICARE PART A AND B / Product Type: *No Product type* /     Does insurance require precert for SNF: No    Potential assistance Purchasing Medications: No  Meds-to-Beds request:      No Pharmacies Listed    Notes:    Factors facilitating achievement of predicted outcomes: Family support, Motivated, Cooperative, Pleasant, Sense of humor, Good insight into deficits, and Has needed Durable Medical Equipment at home    Barriers to discharge: Medical complications    Additional Case Management Notes: Chart review completed. Spoke with pt at bedside this AM. Pt stated he is independent with his ADL's and will return home with his wife. They are planning on returning to [place] tomorrow with his wife and is purchasing a cane to have on his own until he gets back to [place]. He thinks he will be discharged today and his wife will come get him. Discussed recommendations for home therapy and he stated he will get outpatient therapy once home from his PCP. He stated he came here for his surgery. He denied all needs from writer.     IMM given to pt.    100% on room air per vitals in epic.     CM/SW not following. Please consult if needs arise.    The Plan for Transition of Care is related to the following treatment goals of Trigeminal neuralgia [G50.0]    GALLO Santa   for [facility] ([facility])  [employer]: [phone]

## 2024-07-05 NOTE — PLAN OF CARE
Problem: Safety - Adult  Goal: Free from fall injury  Outcome: Progressing  Flowsheets (Taken 7/5/2024 0423)  Free From Fall Injury: Instruct family/caregiver on patient safety  Note: Pt is a Med fall risk. See Chiu Fall Score and ABCDS Injury Risk assessments.   Explained fall risk precautions to pt and family and rationale behind their use to keep the patient safe. Pt bed is in low position, side rails up, call light and belongings are in reach. Fall wristband applied and present on pts wrist.  Bed alarm on.  Pt encouraged to call for assistance. Will continue with hourly rounds for PO intake, pain needs, toileting and repositioning as needed.        Problem: Pain  Goal: Verbalizes/displays adequate comfort level or baseline comfort level  Outcome: Progressing  Flowsheets (Taken 7/5/2024 0423)  Verbalizes/displays adequate comfort level or baseline comfort level:   Encourage patient to monitor pain and request assistance   Administer analgesics based on type and severity of pain and evaluate response   Consider cultural and social influences on pain and pain management   Notify Licensed Independent Practitioner if interventions unsuccessful or patient reports new pain   Implement non-pharmacological measures as appropriate and evaluate response   Assess pain using appropriate pain scale  Note: Pt with complaints of incision pain during shift. Medicated with  PRN morphine  per MAR with good response per pt.  Pt denies further needs at this time. Will continue to monitor and implement plan of care.

## 2024-07-11 NOTE — OP NOTE
Operative Report    PATIENT NAME: Aston Aguilar  YOB: 1960  MEDICAL RECORD# [id number]  SURGERY DATE: 7/3/2024  SURGEON:  Franco Crandall MD, PhD  ASSISTANT:  Briana Nuno PA-C., served as assistant on this surgery due to the complex nature of the surgery and the lack of a resident or fellow assistant. She provided assistance with critical tissue retraction at parts of the surgery, wound closure and assistance with protecting critical neuro elements.  DICTATED BY: Franco Crandall MD, PhD      PREOPERATIVE DIAGNOSIS:  1. Left sided trigeminal neuralgia    POSTOPERATIVE DIAGNOSIS:  1. Left sided trigeminal neuralgia    PROCEDURES PERFORMED:  1.  Left retrosigmoid craniectomy    2.  Microvascular decompression of the left trigeminal nerve away from the superior cerebellar artery  3.  Placement of Telfa sponge  4.  Microdissection  5.  Medpor craniplasty less than 5 cm (2.1 x 3 cm)    ANESTHESIA:  General    INDICATION FOR SURGERY: The patient is a 63 y.o. male who presented with complaints of un-remmiting, paroxysmal left side facial pain.They are currently experiencing severe pain within the left V1-V2 distribution, occuring several times each day.  MRI fiesta scan demonstrated vascular compression of the left trigeminal nerve by the superior cerebellar artery. The patient elected to proceed with microvascular decompression of the nerve. We discussed the risks and benefits to include (but not limited to): bleeding, infection, inability to relieve her pain, CSF leak, need for further surgery, vascular injury, loss of hearing (left), facial weakness, stroke, coma and death. He expressed his understanding of these risks and elected for operative intervention.    OPERATIVE FINDINGS: The left SCA was found draped across the superior aspect of the trigeminal root entry zone and was adherent to the nerve via arachnoid attachments. The artery was  from the nerve and a Telfa sponge was placed to

## 2024-07-25 NOTE — PROGRESS NOTES
Physician Progress Note      PATIENT:               PRANAV MUSTAFA  CSN #:                  [id number]  :                       1960  ADMIT DATE:       7/3/2024 10:12 AM  DISCH DATE:        2024 1:22 PM  RESPONDING  PROVIDER #:        Ivan Barger MD          QUERY TEXT:    Dear Dr. Wright,  Patient admitted with trigeminal neuralgia and had left microvascular   decompression. Pt noted to have BPH with LUTS (lower urinary tract symptoms)   and was treated with Flomax.?If possible, please document in progress notes   and discharge summary if you are evaluating and/or treating any of the   following:    The medical record reflects the following:  Risk Factors: Hx HTN, DM, CVA, CKD,  BPH/LUTS  Clinical Indicators:  Internal med consult notes \"BPH/LUTS\"  Treatment: Flomax 0.4 mg daily  Thank you,  Nayana Alvarez RN, CDS  [email or web address]  Options provided:  -- BPH with partial/complete urinary obstruction  -- BPH with urinary retention without obstruction  -- Other - I will add my own diagnosis  -- Disagree - Not applicable / Not valid  -- Disagree - Clinically unable to determine / Unknown  -- Refer to Clinical Documentation Reviewer    PROVIDER RESPONSE TEXT:    This patient has BPH with partial/complete urinary obstruction.    Query created by: Nayana Martin on 2024 6:41 AM      Electronically signed by:  Ivan Barger MD 2024 11:49 AM

## (undated) DEVICE — PIN ADLT MAYFIELD RIGID MOLD FINGER

## (undated) DEVICE — GARMENT,MEDLINE,DVT,INT,CALF,MED, GEN2: Brand: MEDLINE

## (undated) DEVICE — SOLUTION IV 1000ML 0.9% SOD CHL

## (undated) DEVICE — GLOVE SURG SZ 65 CRM LTX FREE POLYISOPRENE POLYMER BEAD ANTI

## (undated) DEVICE — NEURO SPONGES: Brand: DEROYAL

## (undated) DEVICE — SUTURE MONOCRYL + SZ 4-0 L27IN ABSRB UD L19MM PS-2 3/8 CIR MCP426H

## (undated) DEVICE — AGENT HEMSTAT W2XL4IN OXIDIZED REGENERATED CELOS ABSRB SFT

## (undated) DEVICE — HOOK RETRACT STERIL 12MM S STL BLNT E STAY LONE STAR

## (undated) DEVICE — UNDERGLOVE SURG SZ 8 BLU LTX FREE SYN POLYISOPRENE POLYMER

## (undated) DEVICE — APPLICATOR MEDICATED 10.5 CC SOLUTION HI LT ORNG CHLORAPREP

## (undated) DEVICE — COVER LT HNDL CAM BLU DISP W/ SURG CTRL

## (undated) DEVICE — AGENT HEMOSTATIC SURGIFLOW MATRIX KIT W/THROMBIN

## (undated) DEVICE — NEPTUNE E-SEP SMOKE EVACUATION PENCIL, COATED, 70MM BLADE, PUSH BUTTON SWITCH: Brand: NEPTUNE E-SEP

## (undated) DEVICE — TRAP FLUID

## (undated) DEVICE — AGENT HEMOSTATIC SURG ORIGINAL ABS 2X14IN LOOSE KNIT 12/CA

## (undated) DEVICE — SEALANT SURG 13 YR DURA AUTOSPRAY ADHERUS

## (undated) DEVICE — SUTURE VICRYL + SZ 3-0 L18IN ABSRB UD SH 1/2 CIR TAPERCUT NDL VCP864D

## (undated) DEVICE — ADAPTER LAB INTMED LUER 17GA

## (undated) DEVICE — BLADE CLIPPER SURG SENSICLIP

## (undated) DEVICE — TOWEL,STOP FLAG GOLD N-W: Brand: MEDLINE

## (undated) DEVICE — GLOVE SURG SZ 6 L12IN FNGR THK75MIL WHT LTX POLYMER BEAD

## (undated) DEVICE — COVER XR CASS W20XL41IN UNIV ADH STRP

## (undated) DEVICE — TOBRA BONE BASKET- AUTOGRAFT BONE COLLECTION SYSTEM WITH MESH FILTER AND GRAFT COMPRESSOR: Brand: TOBRA BONE BASKET

## (undated) DEVICE — SUTURE VICRYL + SZ 2-0 L27IN ABSRB WHT SH 1/2 CIR TAPERCUT VCP417H

## (undated) DEVICE — SUTURE NRLN SZ 4-0 L18IN NONABSORBABLE BLK L13MM TF 1/2 CIR C584D

## (undated) DEVICE — TOOL MR8-F2/7TA23 MR8 F2/7CM TAPER 2.3MM: Brand: MIDAS REX MR8

## (undated) DEVICE — CRANI: Brand: MEDLINE INDUSTRIES, INC.

## (undated) DEVICE — SUTURE NONABSORBABLE MONOFILAMENT 2-0 FS 18 IN ETHILON 664H

## (undated) DEVICE — SUTURE VICRYL SZ 2-0 L18IN ABSRB UD CT-1 L36MM 1/2 CIR J839D

## (undated) DEVICE — GLOVE SURG SZ 75 L12IN FNGR THK94MIL TRNSLUC YEL LTX

## (undated) DEVICE — TOOL MR8-9BA50 MR8 9CM BALL 5MM: Brand: MIDAS REX MR8

## (undated) DEVICE — SURGIFOAM SPNG SZ 100